# Patient Record
Sex: MALE | Race: WHITE | NOT HISPANIC OR LATINO | Employment: FULL TIME | URBAN - METROPOLITAN AREA
[De-identification: names, ages, dates, MRNs, and addresses within clinical notes are randomized per-mention and may not be internally consistent; named-entity substitution may affect disease eponyms.]

---

## 2017-02-21 ENCOUNTER — OFFICE VISIT (OUTPATIENT)
Dept: FAMILY MEDICINE | Facility: CLINIC | Age: 22
End: 2017-02-21
Payer: COMMERCIAL

## 2017-02-21 VITALS
SYSTOLIC BLOOD PRESSURE: 128 MMHG | HEIGHT: 73 IN | RESPIRATION RATE: 20 BRPM | BODY MASS INDEX: 23.37 KG/M2 | DIASTOLIC BLOOD PRESSURE: 72 MMHG | TEMPERATURE: 99 F | HEART RATE: 79 BPM | OXYGEN SATURATION: 99 % | WEIGHT: 176.38 LBS

## 2017-02-21 DIAGNOSIS — S31.831A ANAL TEAR: Primary | ICD-10-CM

## 2017-02-21 PROCEDURE — 99999 PR PBB SHADOW E&M-EST. PATIENT-LVL III: CPT | Mod: PBBFAC,,, | Performed by: FAMILY MEDICINE

## 2017-02-21 PROCEDURE — 99213 OFFICE O/P EST LOW 20 MIN: CPT | Mod: S$GLB,,, | Performed by: FAMILY MEDICINE

## 2017-02-21 PROCEDURE — 1160F RVW MEDS BY RX/DR IN RCRD: CPT | Mod: S$GLB,,, | Performed by: FAMILY MEDICINE

## 2017-02-21 NOTE — MR AVS SNAPSHOT
"    [facility]  [address] [place] [address]  Phone: [phone]                  Marycarmen Jj   2017 1:00 PM   Office Visit    Description:  Male : 1995   Provider:  Felicity Berry MD   Department:  [facility]           Reason for Visit     Rectal Bleeding                To Do List           Goals (5 Years of Data)     None      [facility]     [facility] On Call Nurse Care Line -  Assistance  Registered nurses in the [facility] On Call Center provide clinical advisement, health education, appointment booking, and other advisory services.  Call for this free service at [phone].             Medications           Message regarding Medications     Verify the changes and/or additions to your medication regime listed below are the same as discussed with your clinician today.  If any of these changes or additions are incorrect, please notify your healthcare provider.             Verify that the below list of medications is an accurate representation of the medications you are currently taking.  If none reported, the list may be blank. If incorrect, please contact your healthcare provider. Carry this list with you in case of emergency.                Clinical Reference Information           Your Vitals Were     BP Pulse Temp Resp Height Weight    128/72 (BP Location: Right arm, Patient Position: Sitting, BP Method: Manual) 79 98.7 °F (37.1 °C) (Oral) 20 6' 1" (1.854 m) 80 kg (176 lb 5.9 oz)    SpO2 BMI             99% 23.27 kg/m2         Blood Pressure          Most Recent Value    BP  128/72      Allergies as of 2017     No Known Allergies      Immunizations Administered on Date of Encounter - 2017     None      [facility] Sign-Up     Activating your [facility] account is as easy as 1-2-3!     1) Visit [email or web address], select Sign Up Now, enter this activation code and your date of birth, then select " Next.  [email or web address]  Expires: 4/7/2017  1:34 PM      2) Create a username and password to use when you visit [facility] in the future and select a security question in case you lose your password and select Next.    3) Enter your e-mail address and click Sign Up!    Additional Information  If you have questions, please e-mail [email or web address] or call [phone] to talk to our [facility] staff. Remember, [facility] is NOT to be used for urgent needs. For medical emergencies, dial 911.         Language Assistance Services     ATTENTION: Language assistance services are available, free of charge. Please call [phone].      ATENCIÓN: Si maikella reynaldo, tiene a gomes disposición servicios gratuitos de asistencia lingüística. Llame al [phone].     [facility] Ý: N?u b?n nói Ti?ng Vi?t, có các d?ch v? h? tr? ngôn ng? mi?n phí dành cho b?n. G?i s? [phone].         [facility] [employer] complies with applicable Federal civil rights laws and does not discriminate on the basis of race, color, national origin, age, disability, or sex.

## 2017-02-21 NOTE — PROGRESS NOTES
Routine Office Visit    Patient Name: Marycarmen Jj    : 1995  MRN: [id number]    Subjective:  Marycarmen is a 21 y.o. male who presents today for:    1.   21 y.o male, has sex with males,  - bleeding on the toilet paper after BMs - formed stools, going on for 1 week.  Discomfort when having the BM, no acute pains. Some itching around anus.  Middle of last week, put lotion on it - Kate because it felt dry.   Had this happen before, but usually went away a day after.   This time lasting for 1 week.  He sees the blood on/off, not with every BM.  He says that he may have had more aggressive intercourse last week and that may have caused tears but he does not know. He denies feeling any external hemorrhoids.      Past Medical History  History reviewed. No pertinent past medical history.    Past Surgical History  History reviewed. No pertinent past surgical history.    Family History  Family History   Problem Relation Age of Onset    No Known Problems Mother     Arthritis Father     Hypertension Maternal Uncle        Social History  Social History     Social History    Marital status: Single     Spouse name: N/A    Number of children: N/A    Years of education: N/A     Occupational History    Not on file.     Social History Main Topics    Smoking status: Never Smoker    Smokeless tobacco: Not on file    Alcohol use Not on file    Drug use: Not on file    Sexual activity: Not on file     Other Topics Concern    Not on file     Social History Narrative       Current Medications  No current outpatient prescriptions on file prior to visit.     No current facility-administered medications on file prior to visit.        Allergies   Review of patient's allergies indicates:  No Known Allergies    Review of Systems (Pertinent positives)  Constititutional: Weight loss, excess fatigue, chills, fever, night sweats, weakness, loss of appetite  Skin: Rash, itching, lesions, color changes  Eyes: Glasses,  "contacts, vision changes, double vision, blurred vision, pain discharge, excess tearing.  Ears: Earache, ringing in ears, discharge, hearing loss, hearing aid, popping, infection Nose: stuffy nose, mouth breathing, post-nasal drip,   Throat: hoarseness, bad breath, swelling, sore throat  Heart: Chest pain, angina, palpitations, extra heart beats, JONES, Murmur, claudication, PND  Stomach/Intestine: Heartburn, Nausea, vomiting, diarrhea, indigestion, bloating, constipation, change in bowel movements, blood in stool, jaundice, hemorrhoids, hernia, rectal bleeding or pain, colon polyps,    Visit Vitals    /72 (BP Location: Right arm, Patient Position: Sitting, BP Method: Manual)    Pulse 79    Temp 98.7 °F (37.1 °C) (Oral)    Resp 20    Ht 6' 1" (1.854 m)    Wt 80 kg (176 lb 5.9 oz)    SpO2 99%    BMI 23.27 kg/m2       GENERAL APPEARANCE: in no apparent distress and well developed and well nourished  RESPIRATORY: appears well, vitals normal, no respiratory distress, acyanotic, normal RR, chest clear, no wheezing, crepitations, rhonchi, normal symmetric air entry  HEART: regular rate and rhythm, S1, S2 normal, no murmur, click, rub or gallop.    NEUROLOGIC: normal without focal findings, CN II-XII are intact.    Extremities: warm/well perfused.  No abnormal hair patterns.  No clubbing, cyanosis or edema.    Rectal:   + 2 superficial external tears at outside of anus, nonbleeding.  One at 12 o'clock, one at 6' o'clock.  Rectal examination - no tenderness or masses.  No external hemorrhoids present.  PSYCH: Alert, oriented x 3, thought content appropriate, speech normal, pleasant and cooperative, good eye contact, well groomed, recall good, concentration/judgement good and apparently average intelligence.    Assessment/Plan:  Marycarmen Jj is a 21 y.o. male who presents today for :    Marycarmen was seen today for rectal bleeding.    Diagnoses and all orders for this visit:    Anal tear  -   PRN witch hazel " after stools for help with itching  -  Avoid male intercourse for 1 month until tissue is healed  -  If not healed or still having itching, return to clinic for inspection  -  Be careful with pain, worsening bleeding - if so, return to clinic    F/u PRN with worsening symptoms

## 2017-03-14 ENCOUNTER — OFFICE VISIT (OUTPATIENT)
Dept: FAMILY MEDICINE | Facility: CLINIC | Age: 22
End: 2017-03-14
Payer: COMMERCIAL

## 2017-03-14 VITALS
BODY MASS INDEX: 22.97 KG/M2 | HEART RATE: 102 BPM | SYSTOLIC BLOOD PRESSURE: 126 MMHG | DIASTOLIC BLOOD PRESSURE: 82 MMHG | RESPIRATION RATE: 18 BRPM | WEIGHT: 173.31 LBS | HEIGHT: 73 IN | TEMPERATURE: 99 F | OXYGEN SATURATION: 99 %

## 2017-03-14 DIAGNOSIS — J30.2 SEASONAL ALLERGIC RHINITIS, UNSPECIFIED ALLERGIC RHINITIS TRIGGER: Primary | ICD-10-CM

## 2017-03-14 PROCEDURE — 99999 PR PBB SHADOW E&M-EST. PATIENT-LVL III: CPT | Mod: PBBFAC,,, | Performed by: NURSE PRACTITIONER

## 2017-03-14 PROCEDURE — 99213 OFFICE O/P EST LOW 20 MIN: CPT | Mod: S$GLB,,, | Performed by: NURSE PRACTITIONER

## 2017-03-14 PROCEDURE — 1160F RVW MEDS BY RX/DR IN RCRD: CPT | Mod: S$GLB,,, | Performed by: NURSE PRACTITIONER

## 2017-03-14 RX ORDER — CETIRIZINE HYDROCHLORIDE 10 MG/1
10 TABLET ORAL DAILY
Qty: 90 TABLET | Refills: 1 | Status: SHIPPED | OUTPATIENT
Start: 2017-03-14 | End: 2017-08-08

## 2017-03-14 RX ORDER — AZELASTINE HYDROCHLORIDE, FLUTICASONE PROPIONATE 137; 50 UG/1; UG/1
1 SPRAY, METERED NASAL 2 TIMES DAILY
Qty: 1 BOTTLE | Refills: 5 | Status: SHIPPED | OUTPATIENT
Start: 2017-03-14 | End: 2017-06-08 | Stop reason: CLARIF

## 2017-03-14 NOTE — MR AVS SNAPSHOT
[facility]  [address] [place] [address]  Phone: [phone]                  Marycarmen Jj   3/14/2017 1:40 PM   Office Visit    Description:  Male : 1995   Provider:  Melissa Manzo NP   Department:  [facility]           Reason for Visit     Cough     Sinus Problem           Diagnoses this Visit        Comments    Seasonal allergic rhinitis, unspecified allergic rhinitis trigger    -  Primary            To Do List           Goals (5 Years of Data)     None      Follow-Up and Disposition     Return if symptoms worsen or fail to improve.       These Medications        Disp Refills Start End    cetirizine (ZYRTEC) 10 MG tablet 90 tablet 1 3/14/2017     Take 1 tablet (10 mg total) by mouth once daily. - Oral    Pharmacy: [id number] - [place], [place] - [address] Ph #: [phone]       azelastine-fluticasone 137-50 mcg/spray Spry nassal spray 1 Bottle 5 3/14/2017     1 spray by Each Nare route 2 (two) times daily. - Each Nare    Pharmacy: [id number] - [place], [place] - [address] Ph #: [phone]         [facility] Call     [facility] On Call Nurse Care Line -  Assistance  Registered nurses in the [facility] Center provide clinical advisement, health education, appointment booking, and other advisory services.  Call for this free service at [phone].             Medications           Message regarding Medications     Verify the changes and/or additions to your medication regime listed below are the same as discussed with your clinician today.  If any of these changes or additions are incorrect, please notify your healthcare provider.        START taking these NEW medications        Refills    cetirizine (ZYRTEC) 10 MG tablet 1    Sig: Take 1 tablet (10 mg total) by mouth once daily.    Class: Normal    Route:  "Oral    azelastine-fluticasone 137-50 mcg/spray Spry nassal spray 5    Si spray by Each Nare route 2 (two) times daily.    Class: Normal    Route: Each Nare           Verify that the below list of medications is an accurate representation of the medications you are currently taking.  If none reported, the list may be blank. If incorrect, please contact your healthcare provider. Carry this list with you in case of emergency.           Current Medications     azelastine-fluticasone 137-50 mcg/spray Spry nassal spray 1 spray by Each Nare route 2 (two) times daily.    cetirizine (ZYRTEC) 10 MG tablet Take 1 tablet (10 mg total) by mouth once daily.           Clinical Reference Information           Your Vitals Were     BP Pulse Temp Resp Height Weight    126/82 (BP Location: Left arm, Patient Position: Sitting, BP Method: Manual) 102 98.7 °F (37.1 °C) (Oral) 18 6' 1" (1.854 m) 78.6 kg (173 lb 4.5 oz)    SpO2 BMI             99% 22.86 kg/m2         Blood Pressure          Most Recent Value    BP  126/82      Allergies as of 3/14/2017     No Known Allergies      Immunizations Administered on Date of Encounter - 3/14/2017     None      [facility] Sign-Up     Activating your [facility] account is as easy as 1-2-3!     1) Visit [email or web address], select Sign Up Now, enter this activation code and your date of birth, then select Next.  [email or web address]  Expires: 2017  2:34 PM      2) Create a username and password to use when you visit [facility] in the future and select a security question in case you lose your password and select Next.    3) Enter your e-mail address and click Sign Up!    Additional Information  If you have questions, please e-mail [email or web address] or call [phone] to talk to our [facility] staff. Remember, [facility] is NOT to be used for urgent needs. For medical emergencies, dial 911.         Language Assistance Services     ATTENTION: Language assistance services are available, free of charge. " Please call [phone].      ATENCIÓN: Si habla español, tiene a gomes disposición servicios gratuitos de asistencia lingüística. Llame al [phone].     CHÚ Ý: N?u b?n nói Ti?ng Vi?t, có các d?ch v? h? tr? ngôn ng? mi?n phí dành cho b?n. G?i s? [phone].         [facility] complies with applicable Federal civil rights laws and does not discriminate on the basis of race, color, national origin, age, disability, or sex.

## 2017-03-14 NOTE — PROGRESS NOTES
Patient Name: Marycarmen Jj    : 1995  MRN: [id number]    Subjective:  Marycarmen is a 21 y.o. male who presents today for     1. Unproductive cough, scratchy throat, PND and nasal congestion x 2 weeks. Taking otc medication without improvement. Also reports concern anal irritation s/p anal tear, concern this may have  Recur.    Past Medical History  History reviewed. No pertinent past medical history.    Past Surgical History  History reviewed. No pertinent surgical history.    Family History  Family History   Problem Relation Age of Onset    No Known Problems Mother     Arthritis Father     Hypertension Maternal Uncle        Social History  Social History     Social History    Marital status: Single     Spouse name: N/A    Number of children: N/A    Years of education: N/A     Occupational History    Not on file.     Social History Main Topics    Smoking status: Never Smoker    Smokeless tobacco: Not on file    Alcohol use Not on file    Drug use: Not on file    Sexual activity: Not on file     Other Topics Concern    Not on file     Social History Narrative       Allergies  Review of patient's allergies indicates:  No Known Allergies -reviewed and updated      Medications  Reviewed and updated.   Current Outpatient Prescriptions   Medication Sig Dispense Refill    azelastine-fluticasone 137-50 mcg/spray Spry nassal spray 1 spray by Each Nare route 2 (two) times daily. 1 Bottle 5    cetirizine (ZYRTEC) 10 MG tablet Take 1 tablet (10 mg total) by mouth once daily. 90 tablet 1     No current facility-administered medications for this visit.          Review of Systems   Constitutional: Negative for chills and fever.   HENT: Positive for congestion. Negative for ear pain and sore throat.    Respiratory: Positive for cough and sputum production (yellow). Negative for shortness of breath and wheezing.    Cardiovascular: Negative for chest pain.   Musculoskeletal: Negative for myalgias.  "  Neurological: Negative for headaches.         Physical Exam  /82 (BP Location: Left arm, Patient Position: Sitting, BP Method: Manual)  Pulse 102  Temp 98.7 °F (37.1 °C) (Oral)   Resp 18  Ht 6' 1" (1.854 m)  Wt 78.6 kg (173 lb 4.5 oz)  SpO2 99%  BMI 22.86 kg/m2  Physical Exam   Constitutional: He appears well-developed and well-nourished. No distress.   HENT:   Head: Normocephalic.   Nose: Mucosal edema present. Right sinus exhibits no maxillary sinus tenderness. Left sinus exhibits no maxillary sinus tenderness.   Mouth/Throat: Oropharynx is clear and moist. No posterior oropharyngeal edema or posterior oropharyngeal erythema.   Cardiovascular: Normal rate, regular rhythm and normal heart sounds.    Pulmonary/Chest: Effort normal and breath sounds normal.   Genitourinary:   Genitourinary Comments: Anus appears normal, no obvious external tears noted   Skin: He is not diaphoretic.         Assessment/Plan:  Marycarmen Jj is a 21 y.o. male who presents today for :    Seasonal allergic rhinitis, unspecified allergic rhinitis trigger  -     cetirizine (ZYRTEC) 10 MG tablet; Take 1 tablet (10 mg total) by mouth once daily.  Dispense: 90 tablet; Refill: 1  -     azelastine-fluticasone 137-50 mcg/spray Spry nassal spray; 1 spray by Each Nare route 2 (two) times daily.  Dispense: 1 Bottle; Refill: 5        Return if symptoms worsen or fail to improve.      "

## 2017-05-02 ENCOUNTER — OFFICE VISIT (OUTPATIENT)
Dept: FAMILY MEDICINE | Facility: CLINIC | Age: 22
End: 2017-05-02
Payer: COMMERCIAL

## 2017-05-02 VITALS
DIASTOLIC BLOOD PRESSURE: 80 MMHG | WEIGHT: 171.94 LBS | SYSTOLIC BLOOD PRESSURE: 112 MMHG | HEIGHT: 73 IN | OXYGEN SATURATION: 99 % | TEMPERATURE: 99 F | RESPIRATION RATE: 20 BRPM | HEART RATE: 87 BPM | BODY MASS INDEX: 22.79 KG/M2

## 2017-05-02 DIAGNOSIS — A63.0 ANAL WARTS: Primary | ICD-10-CM

## 2017-05-02 PROCEDURE — 99999 PR PBB SHADOW E&M-EST. PATIENT-LVL III: CPT | Mod: PBBFAC,,, | Performed by: FAMILY MEDICINE

## 2017-05-02 PROCEDURE — 99213 OFFICE O/P EST LOW 20 MIN: CPT | Mod: S$GLB,,, | Performed by: FAMILY MEDICINE

## 2017-05-02 PROCEDURE — 1160F RVW MEDS BY RX/DR IN RCRD: CPT | Mod: S$GLB,,, | Performed by: FAMILY MEDICINE

## 2017-05-02 NOTE — PROGRESS NOTES
Routine Office Visit    Patient Name: Marycarmen Jj    : 1995  MRN: [id number]    Subjective:  Marycarmen is a 21 y.o. male who presents today for:    1.  Sore throat - occasional pain following fluids/solids.  Woke up with yellow mucous in his mouth this morning.  He tried zyrtec and a nasal spray but the nasal spray didn't help.  No sick contacts.   +more congestion.  Denies itchy watery eyes.  No recent traveling.   [place] this weekend.  About a month.  Way more bothersome at night.  Denies f/c/n/v/d.      2.  Bumps on the rectum - not painful.  Noticed them over the last week or two.  He is sexually active with his boyfriend for the past 2 years.      Past Medical History  History reviewed. No pertinent past medical history.    Past Surgical History  History reviewed. No pertinent surgical history.    Family History  Family History   Problem Relation Age of Onset    No Known Problems Mother     Arthritis Father     Hypertension Maternal Uncle        Social History  Social History     Social History    Marital status: Single     Spouse name: N/A    Number of children: N/A    Years of education: N/A     Occupational History    Not on file.     Social History Main Topics    Smoking status: Never Smoker    Smokeless tobacco: Not on file    Alcohol use Not on file    Drug use: Not on file    Sexual activity: Not on file     Other Topics Concern    Not on file     Social History Narrative       Current Medications  Current Outpatient Prescriptions on File Prior to Visit   Medication Sig Dispense Refill    cetirizine (ZYRTEC) 10 MG tablet Take 1 tablet (10 mg total) by mouth once daily. 90 tablet 1    azelastine-fluticasone 137-50 mcg/spray Spry nassal spray 1 spray by Each Nare route 2 (two) times daily. 1 Bottle 5     No current facility-administered medications on file prior to visit.        Allergies   Review of patient's allergies indicates:  No Known Allergies    Review of Systems  "(Pertinent positives)  Constititutional: Weight loss, excess fatigue, chills, fever, night sweats, weakness, loss of appetite  Ears: Earache, ringing in ears, discharge, hearing loss, hearing aid, popping, infection Nose: stuffy nose, mouth breathing, post-nasal drip,   Throat: hoarseness, bad breath, swelling, sore throat  Lungs: Cough, sputum, wheeze, frequent URI, SOA, Asthma  Heart: Chest pain, angina, palpitations, extra heart beats  Stomach/Intestine: Heartburn, Nausea, vomiting, diarrhea, indigestion, bloating, constipation  Bones/Muscles/Joints: Joint pain, deformities, back pain, swelling, stiffness  Brain: Numbness, tingling, tremor, fainting, headaches, muscle weakness    /80 (BP Location: Left arm, Patient Position: Sitting, BP Method: Manual)  Pulse 87  Temp 98.5 °F (36.9 °C) (Oral)   Resp 20  Ht 6' 1" (1.854 m)  Wt 78 kg (171 lb 15.3 oz)  SpO2 99%  BMI 22.69 kg/m2    GENERAL APPEARANCE: in no apparent distress and well developed and well nourished  RESPIRATORY: appears well, vitals normal, no respiratory distress, acyanotic, normal RR, chest clear, no wheezing, crepitations, rhonchi, normal symmetric air entry  HEART: regular rate and rhythm, S1, S2 normal, no murmur, click, rub or gallop.    NEUROLOGIC: normal without focal findings, CN II-XII are intact.   Anal: rectum with pinpoint white pustules - 3, at 12 o'clock.  One also noted at 6 o'clock.  Nontender.  No other masses identified. No evidence of lacerations.   PSYCH: Alert, oriented x 3, thought content appropriate, speech normal, pleasant and cooperative, good eye contact, well groomed, recall good, concentration/judgement good and apparently average intelligence.    Assessment/Plan:  Marycarmen Jj is a 21 y.o. male who presents today for :    Marycarmen was seen today for bumps on rectum and uri.    Diagnoses and all orders for this visit:    Anal warts   -   Discussed likely benign warts  -   Should do anal HPV testing at " annual exam    Sore throat  -  Likely allergy associated.   Low likelihood of strep throat as it has been chronic and going on for more than a month.  -  Continue Zyrtec, continue water intake.  Avoid allergens.     Over the counter remedies:   -Increase your water intake to 64-80 oz daily   -Nasal Saline spray (Over the counter [place] spray or [place]) 2 sprays each nostril - 2-3 times a day for nasal congestion   -Tylenol 500 mg 2 tablets or Ibuprofen 200 mg 2 tablets every 4-6 hours as needed for fever, headaches, sore throat, ear pain, bodyaches, and/or  nasal/sinus inflammation   -Warm salt water gargles with 1/2 cup water and 1 tablespoon salt every 4 hours   -Warm tea with honey and lemon   -Follow up with PCP in 1 week if symptoms do not resolve      F/u 1 month - annual

## 2017-05-02 NOTE — MR AVS SNAPSHOT
[facility]  [address] [place] [address]  Phone: [phone]                  Marycarmen Jj   2017 10:40 AM   Office Visit    Description:  Male : 1995   Provider:  Felicity Berry MD   Department:  [facility]           Reason for Visit     Bumps on rectum     URI                To Do List           Future Appointments        Provider Department Dept Phone    2017 8:00 AM Felicity Berry MD [facility] [phone]      Goals (5 Years of Data)     None      Follow-Up and Disposition     Return in about 4 weeks (around 2017) for annual.      [facility] On Call     [facility] On Call Nurse Care Line -  Assistance  Unless otherwise directed by your provider, please contact [facility], our nurse care line that is available for  assistance.     Registered nurses in the [facility] On Call Center provide: appointment scheduling, clinical advisement, health education, and other advisory services.  Call: [phone] (toll free)               Medications           Message regarding Medications     Verify the changes and/or additions to your medication regime listed below are the same as discussed with your clinician today.  If any of these changes or additions are incorrect, please notify your healthcare provider.             Verify that the below list of medications is an accurate representation of the medications you are currently taking.  If none reported, the list may be blank. If incorrect, please contact your healthcare provider. Carry this list with you in case of emergency.           Current Medications     cetirizine (ZYRTEC) 10 MG tablet Take 1 tablet (10 mg total) by mouth once daily.    azelastine-fluticasone 137-50 mcg/spray Spry nassal spray 1 spray by Each Nare route 2 (two) times daily.           Clinical Reference Information           Your Vitals Were     BP Pulse Temp Resp Height Weight     "112/80 (BP Location: Left arm, Patient Position: Sitting, BP Method: Manual) 87 98.5 °F (36.9 °C) (Oral) 20 6' 1" (1.854 m) 78 kg (171 lb 15.3 oz)    SpO2 BMI             99% 22.69 kg/m2         Blood Pressure          Most Recent Value    BP  112/80      Allergies as of 5/2/2017     No Known Allergies      Immunizations Administered on Date of Encounter - 5/2/2017     None      [facility] Sign-Up     Activating your [facility] account is as easy as 1-2-3!     1) Visit [email or web address], select Sign Up Now, enter this activation code and your date of birth, then select Next.  [email or web address]  Expires: 6/16/2017 11:23 AM      2) Create a username and password to use when you visit [facility] in the future and select a security question in case you lose your password and select Next.    3) Enter your e-mail address and click Sign Up!    Additional Information  If you have questions, please e-mail [email or web address] or call [phone] to talk to our [facility] staff. Remember, [facility] is NOT to be used for urgent needs. For medical emergencies, dial 911.         Language Assistance Services     ATTENTION: Language assistance services are available, free of charge. Please call [phone].      ATENCIÓN: Si habla español, tiene a gomes disposición servicios gratuitos de asistencia lingüística. Llame al [phone].     CHÚ Ý: N?u b?n nói Ti?ng Vi?t, có các d?ch v? h? tr? ngôn ng? mi?n phí dành cho b?n. G?i s? [phone].         [facility] complies with applicable Federal civil rights laws and does not discriminate on the basis of race, color, national origin, age, disability, or sex.        "

## 2017-05-09 ENCOUNTER — LAB VISIT (OUTPATIENT)
Dept: LAB | Facility: HOSPITAL | Age: 22
End: 2017-05-09
Attending: FAMILY MEDICINE
Payer: COMMERCIAL

## 2017-05-09 ENCOUNTER — OFFICE VISIT (OUTPATIENT)
Dept: FAMILY MEDICINE | Facility: CLINIC | Age: 22
End: 2017-05-09
Payer: COMMERCIAL

## 2017-05-09 VITALS
HEART RATE: 84 BPM | RESPIRATION RATE: 14 BRPM | WEIGHT: 173.5 LBS | SYSTOLIC BLOOD PRESSURE: 96 MMHG | HEIGHT: 73 IN | BODY MASS INDEX: 22.99 KG/M2 | OXYGEN SATURATION: 97 % | TEMPERATURE: 98 F | DIASTOLIC BLOOD PRESSURE: 74 MMHG

## 2017-05-09 DIAGNOSIS — Z11.51 SCREENING FOR HPV (HUMAN PAPILLOMAVIRUS): ICD-10-CM

## 2017-05-09 DIAGNOSIS — Z00.00 VISIT FOR WELL MAN HEALTH CHECK: ICD-10-CM

## 2017-05-09 DIAGNOSIS — Z11.3 SCREEN FOR STD (SEXUALLY TRANSMITTED DISEASE): ICD-10-CM

## 2017-05-09 DIAGNOSIS — Z00.00 VISIT FOR WELL MAN HEALTH CHECK: Primary | ICD-10-CM

## 2017-05-09 LAB
ALBUMIN SERPL BCP-MCNC: 4.3 G/DL
ALP SERPL-CCNC: 74 U/L
ALT SERPL W/O P-5'-P-CCNC: 16 U/L
ANION GAP SERPL CALC-SCNC: 6 MMOL/L
AST SERPL-CCNC: 16 U/L
BASOPHILS # BLD AUTO: 0.06 K/UL
BASOPHILS NFR BLD: 0.9 %
BILIRUB SERPL-MCNC: 1.1 MG/DL
BUN SERPL-MCNC: 11 MG/DL
CALCIUM SERPL-MCNC: 9.8 MG/DL
CHLORIDE SERPL-SCNC: 105 MMOL/L
CHOLEST/HDLC SERPL: 3.5 {RATIO}
CO2 SERPL-SCNC: 30 MMOL/L
CREAT SERPL-MCNC: 0.9 MG/DL
DIFFERENTIAL METHOD: ABNORMAL
EOSINOPHIL # BLD AUTO: 0.2 K/UL
EOSINOPHIL NFR BLD: 3.1 %
ERYTHROCYTE [DISTWIDTH] IN BLOOD BY AUTOMATED COUNT: 13 %
EST. GFR  (AFRICAN AMERICAN): >60 ML/MIN/1.73 M^2
EST. GFR  (NON AFRICAN AMERICAN): >60 ML/MIN/1.73 M^2
GLUCOSE SERPL-MCNC: 82 MG/DL
HCT VFR BLD AUTO: 45.5 %
HDL/CHOLESTEROL RATIO: 28.8 %
HDLC SERPL-MCNC: 125 MG/DL
HDLC SERPL-MCNC: 36 MG/DL
HGB BLD-MCNC: 15.6 G/DL
LDLC SERPL CALC-MCNC: 71.4 MG/DL
LYMPHOCYTES # BLD AUTO: 2.5 K/UL
LYMPHOCYTES NFR BLD: 36.5 %
MCH RBC QN AUTO: 29.2 PG
MCHC RBC AUTO-ENTMCNC: 34.3 %
MCV RBC AUTO: 85 FL
MONOCYTES # BLD AUTO: 0.7 K/UL
MONOCYTES NFR BLD: 9.6 %
NEUTROPHILS # BLD AUTO: 3.4 K/UL
NEUTROPHILS NFR BLD: 49.6 %
NONHDLC SERPL-MCNC: 89 MG/DL
PLATELET # BLD AUTO: 363 K/UL
PMV BLD AUTO: 10.5 FL
POTASSIUM SERPL-SCNC: 4.2 MMOL/L
PROT SERPL-MCNC: 8 G/DL
RBC # BLD AUTO: 5.35 M/UL
SODIUM SERPL-SCNC: 141 MMOL/L
TRIGL SERPL-MCNC: 88 MG/DL
WBC # BLD AUTO: 6.85 K/UL

## 2017-05-09 PROCEDURE — 87591 N.GONORRHOEAE DNA AMP PROB: CPT

## 2017-05-09 PROCEDURE — 36415 COLL VENOUS BLD VENIPUNCTURE: CPT | Mod: PN

## 2017-05-09 PROCEDURE — 99999 PR PBB SHADOW E&M-EST. PATIENT-LVL III: CPT | Mod: PBBFAC,,, | Performed by: FAMILY MEDICINE

## 2017-05-09 PROCEDURE — 80053 COMPREHEN METABOLIC PANEL: CPT

## 2017-05-09 PROCEDURE — 88112 CYTOPATH CELL ENHANCE TECH: CPT | Performed by: PATHOLOGY

## 2017-05-09 PROCEDURE — 80061 LIPID PANEL: CPT

## 2017-05-09 PROCEDURE — 86592 SYPHILIS TEST NON-TREP QUAL: CPT

## 2017-05-09 PROCEDURE — 88112 CYTOPATH CELL ENHANCE TECH: CPT | Mod: 26,,, | Performed by: PATHOLOGY

## 2017-05-09 PROCEDURE — 85025 COMPLETE CBC W/AUTO DIFF WBC: CPT

## 2017-05-09 PROCEDURE — 99395 PREV VISIT EST AGE 18-39: CPT | Mod: S$GLB,,, | Performed by: FAMILY MEDICINE

## 2017-05-09 PROCEDURE — 86803 HEPATITIS C AB TEST: CPT

## 2017-05-09 PROCEDURE — 86703 HIV-1/HIV-2 1 RESULT ANTBDY: CPT

## 2017-05-09 NOTE — MR AVS SNAPSHOT
[facility]  [address] [place] [address]  Phone: [phone]                  Marycarmen Jj   2017 8:00 AM   Office Visit    Description:  Male : 1995   Provider:  Felicity Berry MD   Department:  [facility]           Reason for Visit     Annual Exam           Diagnoses this Visit        Comments    Routine general medical examination at a health care facility    -  Primary     Screen for STD (sexually transmitted disease)         Visit for well [facility] health check         Screening for HPV (human papillomavirus)                To Do List           Goals (5 Years of Data)     None      [facility]     [facility] On Call Nurse Care Line -  Assistance  Unless otherwise directed by your provider, please contact [facility], our nurse care line that is available for  assistance.     Registered nurses in the [facility] On Call Center provide: appointment scheduling, clinical advisement, health education, and other advisory services.  Call: [phone] (toll free)               Medications           Message regarding Medications     Verify the changes and/or additions to your medication regime listed below are the same as discussed with your clinician today.  If any of these changes or additions are incorrect, please notify your healthcare provider.             Verify that the below list of medications is an accurate representation of the medications you are currently taking.  If none reported, the list may be blank. If incorrect, please contact your healthcare provider. Carry this list with you in case of emergency.           Current Medications     cetirizine (ZYRTEC) 10 MG tablet Take 1 tablet (10 mg total) by mouth once daily.    azelastine-fluticasone 137-50 mcg/spray Spry nassal spray 1 spray by Each Nare route 2 (two) times daily.           Clinical Reference Information           Your Vitals Were     BP Pulse Temp Resp Height  "Weight    96/74 (BP Location: Right arm, Patient Position: Sitting, BP Method: Manual) 84 98.1 °F (36.7 °C) (Oral) 14 6' 1" (1.854 m) 78.7 kg (173 lb 8 oz)    SpO2 BMI             97% 22.89 kg/m2         Blood Pressure          Most Recent Value    BP  96/74      Allergies as of 5/9/2017     No Known Allergies      Immunizations Administered on Date of Encounter - 5/9/2017     None      Orders Placed During Today's Visit      Normal Orders This Visit    Liquid-based pap smear, screening     Future Labs/Procedures Expected by Expires    C. trachomatis/N. gonorrhoeae by AMP DNA Urine  5/9/2017 7/8/2018    CBC auto differential  5/9/2017 5/9/2018    Comprehensive metabolic panel  5/9/2017 7/8/2018    Hepatitis C antibody  5/9/2017 7/8/2018    HIV-1 and HIV-2 antibodies  5/9/2017 7/8/2018    Lipid panel  5/9/2017 7/8/2018    RPR  5/9/2017 5/9/2018      [facility] Sign-Up     Activating your [facility] account is as easy as 1-2-3!     1) Visit [email or web address], select Sign Up Now, enter this activation code and your date of birth, then select Next.  [email or web address]  Expires: 6/16/2017 11:23 AM      2) Create a username and password to use when you visit [facility] in the future and select a security question in case you lose your password and select Next.    3) Enter your e-mail address and click Sign Up!    Additional Information  If you have questions, please e-mail [email or web address] or call [phone] to talk to our [facility] staff. Remember, [facility] is NOT to be used for urgent needs. For medical emergencies, dial 911.         Language Assistance Services     ATTENTION: Language assistance services are available, free of charge. Please call [phone].      ATENCIÓN: Si habla español, tiene a gomes disposición servicios gratuitos de asistencia lingüística. Llame al [phone].     CHÚ Ý: N?u b?n nói Ti?ng Vi?t, có các d?ch v? h? tr? ngôn ng? mi?n phí dành cho b?n. G?i s? [phone].         [facility] " [facility] complies with applicable Federal civil rights laws and does not discriminate on the basis of race, color, national origin, age, disability, or sex.

## 2017-05-09 NOTE — PROGRESS NOTES
"Well Man VISIT      CHIEF COMPLAINT  Chief Complaint   Patient presents with    Annual Exam       HPI  Marycarmen Jj is a 21 y.o. male who presents for physical.  He denies any complaints today. He had some allergy symptoms which are now improved.  He is in a monogamous relationship with his boyfriend for several years.  He has no history of STDs.   He's never been screened for HPV.     Social Factors  Tobacco use: No  Alcohol: Yes - minimal.  Per week - 2 drinks  Intimate partner violence screening  "Do you feel safe in your current relationship?" Yes  "Have you ever been in a relationship in which your partner frightened you or hurt you?" No  Regular Exercise: Yes - running.      Sleep - 6-7 hours per night.    Depression  Over the past two weeks, have you felt down, depressed, or hopeless? No  Over the past two weeks, have you felt little interest or pleasure in doing things? No    Reproductive Health  STD screening in last year:  Tested last year ago.  Routine Hep C, HIV, RPR, G/C  HIV screening: no    Screen for Chronic Disease  CHD Risk Factors: male gender  Estimated body mass index is 22.89 kg/(m^2) as calculated from the following:    Height as of this encounter: 6' 1" (1.854 m).    Weight as of this encounter: 78.7 kg (173 lb 8 oz).  Dyslipidemia screening needed: yes  T2DM screening needed: yes  Colonoscopy needed: no  PSA needed: yes  AAA screening needed:yes  Screen men 35 years and older, and men 20 to 34 years of age who have cardiovascular risk factors for dyslipidemia  Begin screening colonoscopies at 50 years of age in men of average risk, and continue until 75 years of age; offer fecal occult blood testing every year, flexible sigmoidoscopy every five years combined with fecal occult blood testing every three years, or colonoscopy every 10 years   The American Urological Association recommends offering PSA testing and digital rectal examination to well-informed men beginning at 40 " "years of age and continuing until life expectancy is less than 10 years  Screen once with ultrasonography in men 65 to 75 years of age if they have a family history or have smoked at [id number] cigarettes in their lifetime  Screen men with a sustained blood pressure greater than 135/80 mm Hg for T2DM    Dad - has rheumatoid arthritis.  Brother - RA.      Immunizations  up to date and documented    ALLERGIES and MEDS were verified.   PMHx, PSHx, FHx, SOCIALHx were updated as pertinent.    REVIEW OF SYSTEMS  Negative 5 point review of systems    PHYSICAL EXAM  VITAL SIGNS: BP 96/74 (BP Location: Right arm, Patient Position: Sitting, BP Method: Manual)  Pulse 84  Temp 98.1 °F (36.7 °C) (Oral)   Resp 14  Ht 6' 1" (1.854 m)  Wt 78.7 kg (173 lb 8 oz)  SpO2 97%  BMI 22.89 kg/m2  GEN: Well developed, Well nourished, No acute distress.  HENT: Normocephalic, Atraumatic, Bilateral external ears normal, Nose normal, Oropharynx moist, No oral exudates.   Eyes: PERRLA, EOMI, Conjunctiva normal, No discharge.   Neck: Supple, No tenderness.  Lymphatic: No cervical or supraclavicular lymphadenopathy noted.   Cardiovascular: Normal heart rate, Normal rhythm, No murmurs, No rubs, No gallops.   Thorax & Lungs: Normal breath sounds, No respiratory distress, No wheezing.  Abdomen: Soft, No tenderness, Bowel sounds normal.  Skin: Warm, Dry, No erythema, No rash.   Extremities: No edema, No tenderness.       ASSESSMENT/PLAN    Marycarmen was seen today for annual exam.    Diagnoses and all orders for this visit:    Visit for [facility] health check  -     CBC auto differential; Future  -     Comprehensive metabolic panel; Future  -     Lipid panel; Future    Screen for STD (sexually transmitted disease)  -     HIV-1 and HIV-2 antibodies; Future  -     RPR; Future  -     Hepatitis C antibody; Future  -     C. trachomatis/N. gonorrhoeae by AMP DNA Urine; Future    Screening for HPV (human papillomavirus)  -     Liquid-based pap smear, " screening    FOLLOW UP: PRN

## 2017-05-10 LAB
C TRACH DNA SPEC QL NAA+PROBE: NOT DETECTED
HCV AB SERPL QL IA: NEGATIVE
HIV 1+2 AB+HIV1 P24 AG SERPL QL IA: NEGATIVE
N GONORRHOEA DNA SPEC QL NAA+PROBE: NOT DETECTED
RPR SER QL: NORMAL

## 2017-05-11 ENCOUNTER — TELEPHONE (OUTPATIENT)
Dept: FAMILY MEDICINE | Facility: CLINIC | Age: 22
End: 2017-05-11

## 2017-05-11 NOTE — TELEPHONE ENCOUNTER
"Please call patient and notify that:    - STD testing was negative  - Your cholesterol panel was all within acceptable range.  The healthy "HDL" cholesterol could be a little better it's 36 and ideal is over 40, but that can improve with healthier eating habits.  Your CBC shows normal hemoglobin, which means you do not have any anemia.  Your platelet numbers were normal as well.     The HPV testing can take a few more days. We will let you know when that comes back.    Sincerely  Dr Berry    "

## 2017-05-15 ENCOUNTER — TELEPHONE (OUTPATIENT)
Dept: FAMILY MEDICINE | Facility: CLINIC | Age: 22
End: 2017-05-15

## 2017-05-15 DIAGNOSIS — R85.619 ABNORMAL ANAL PAPANICOLAOU SMEAR: Primary | ICD-10-CM

## 2017-05-15 NOTE — TELEPHONE ENCOUNTER
"Tried calling patient.  Told patient on VM that we would call him later today -     Results of (anal) PAP have come back for   - Low Grade Squamous intraepithelial lesion    (Can talk to patient directly)    This means that patient should be evaluated further to look for any irregularities in the tissue and potentially do biopsies of suspicious looking tissue. This requires patient to go to colorectal surgeon.  Low grade is shown a lot, and it is not an obvious "high grade" lesion which has higher risk of being concerning lesion.     I have put in the referral for the colorectal surgery.    Sincerely  Dr Berry    "

## 2017-05-16 ENCOUNTER — TELEPHONE (OUTPATIENT)
Dept: FAMILY MEDICINE | Facility: CLINIC | Age: 22
End: 2017-05-16

## 2017-05-30 ENCOUNTER — OFFICE VISIT (OUTPATIENT)
Dept: SURGERY | Facility: CLINIC | Age: 22
End: 2017-05-30
Payer: COMMERCIAL

## 2017-05-30 VITALS
WEIGHT: 169.75 LBS | SYSTOLIC BLOOD PRESSURE: 112 MMHG | HEART RATE: 76 BPM | DIASTOLIC BLOOD PRESSURE: 72 MMHG | BODY MASS INDEX: 22.5 KG/M2 | HEIGHT: 73 IN

## 2017-05-30 DIAGNOSIS — A63.0 ANAL CONDYLOMA: Primary | ICD-10-CM

## 2017-05-30 PROCEDURE — 46600 DIAGNOSTIC ANOSCOPY SPX: CPT | Mod: S$GLB,,, | Performed by: COLON & RECTAL SURGERY

## 2017-05-30 PROCEDURE — 99203 OFFICE O/P NEW LOW 30 MIN: CPT | Mod: 25,S$GLB,, | Performed by: COLON & RECTAL SURGERY

## 2017-05-30 PROCEDURE — 99999 PR PBB SHADOW E&M-EST. PATIENT-LVL III: CPT | Mod: PBBFAC,,, | Performed by: COLON & RECTAL SURGERY

## 2017-05-30 NOTE — LETTER
June 1, 2017      Felicity Berry MD  [address]  [place] [address]           [facility]  [address]  [facility] [address]  Phone: [phone]          Patient: Marycarmen Jj   MR Number: [id number]   YOB: 1995   Date of Visit: 5/30/2017       Dear Dr. Felicity Berry:    Thank you for referring Marycarmen Jj to me for evaluation. Attached you will find relevant portions of my assessment and plan of care.    If you have questions, please do not hesitate to call me. I look forward to following Marycarmen Jj along with you.    Sincerely,    Mark Bella MD    Enclosure  CC:  No Recipients    If you would like to receive this communication electronically, please contact [email or web address] or [phone] to request more information on [email or web address] Link access.    For providers and/or their staff who would like to refer a patient to [facility], please contact us through our one-stop-shop provider referral line, [facility], at [phone].    If you feel you have received this communication in error or would no longer like to receive these types of communications, please e-mail [email or web address]

## 2017-05-30 NOTE — PROGRESS NOTES
Subjective:       Patient ID: Marycarmen Jj is a 21 y.o. male.    Chief Complaint: Consult    HPI  22 yo M referred following abnormal anal PAP smear. Anal PAP done by PCP 5/10/17 - Low-grade squamous intraepithelial lesion (LSIL).  +MSM, +anal receptive intercourse, in a monagamous relationship.  HIV negative 2 weeks ago, no high risk behavior.  No significant pain with BM's or intercourse but does have occasional bleeding after BM's.    Last colonoscopy - never  No family hx of CRC or IBD.    Review of patient's allergies indicates:  No Known Allergies    History reviewed. No pertinent past medical history.    History reviewed. No pertinent surgical history.    Current Outpatient Prescriptions   Medication Sig Dispense Refill    cetirizine (ZYRTEC) 10 MG tablet Take 1 tablet (10 mg total) by mouth once daily. 90 tablet 1    azelastine-fluticasone 137-50 mcg/spray Spry nassal spray 1 spray by Each Nare route 2 (two) times daily. 1 Bottle 5     No current facility-administered medications for this visit.        Family History   Problem Relation Age of Onset    No Known Problems Mother     Arthritis Father     Hypertension Maternal Uncle        Social History     Social History    Marital status: Single     Spouse name: N/A    Number of children: N/A    Years of education: N/A     Social History Main Topics    Smoking status: Never Smoker    Smokeless tobacco: None    Alcohol use None    Drug use: Unknown    Sexual activity: Not Asked     Other Topics Concern    None     Social History Narrative    None       Review of Systems    Objective:      Physical Exam   Constitutional: He is oriented to person, place, and time. He appears well-developed and well-nourished.   HENT:   Head: Normocephalic and atraumatic.   Eyes: Conjunctivae are normal.   Pulmonary/Chest: Effort normal. No respiratory distress.   Abdominal: Soft. Bowel sounds are normal. He exhibits no distension and no mass. There is no  tenderness. There is no rebound and no guarding.   Genitourinary:   Genitourinary Comments: Perineum - normal perianal skin, no mass, no fissure, no external hemorrhoids, punctate external condyloma around anal verge  DEVEN - good tone, no mass  Anoscopy - No signifiant internal hemorrhoids, near-circumferential internal anal condyloma     Neurological: He is alert and oriented to person, place, and time.   Skin: Skin is warm and dry. No erythema.           Assessment:       1. Anal condyloma      LSIL on anal PAP    Plan:   Will schedule for OR for excision/fulgaration & biopsy of anal condyloma.    I have discussed the procedure at length with Marycarmen Jj.  We discussed the rationale, risks, benefits, and alternatives in depth.  We discussed the expected outcomes and potential complications including but not limited to anal stenosis, bleeding, infection, recurrence, prolonged pain, need for further procedures and altered continence.  He verbalized his understanding of the procedure and wishes to proceed.  Written consent was obtained.    Scheduled for 6/16/17.    Mark Bella MD, FACS, FASCRS

## 2017-06-08 ENCOUNTER — ANESTHESIA EVENT (OUTPATIENT)
Dept: SURGERY | Facility: HOSPITAL | Age: 22
End: 2017-06-08
Payer: COMMERCIAL

## 2017-06-08 NOTE — ANESTHESIA PREPROCEDURE EVALUATION
"Anesthesia Assessment: Preoperative EQUATION    Planned Procedure: Procedure(s) (LRB):  EXCISION-CONDYLOMA (N/A)  Requested Anesthesia Type:Choice  Surgeon: Mark Bella MD  Service: Colon and Rectal  Known or anticipated Date of Surgery:6/16/2017    Surgeon notes: reviewed and Anal condyloma    Electronic QUestionnaire Assessment completed via nurse interview with patient.        Marycarmen Jj [[id number]] - 21 y.o. Male     Providers Outside of [facility]     No data to display   Surgical Risk Level     Surgical Risk Level:  2       caRDScore (Clinical Anesthesia Rapid Decision Score)     Very Very Low   Total Score: 3    3 Sum of Clinical Scores   caRDScores (Grouped)     caRDScore - Ane:  1            caRDScore - CVD:  0            caRDScore - Pul:  0            caRDScore - Met:  0            caRDScore - Phy:  2       caRDScore Items     Flowsheet Row Pre-admit from 6/16/2017 in [facility]   Anesthesia   Has/has had bowel disease of small or large intestine Yes [Anal condyloma]   CVD   Activity similar to best ability for maximal activity or exercise METS 4   Pulmonary   Metabolic   Physiologic   Has disorder that impairs normal immunity No [Has "Halo effect"-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-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]   Flags     Red Flag Score:  0            Yellow Flag Score:  1       Red Flags     No data to display   Yellow Flags     Flowsheet Row Pre-admit from 6/16/2017 in [facility]   Has disorder that impairs normal immunity No- ["Halo effect"-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-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]   PONV Risk Score (assumes periop narcotic use = +1, Max=4)     PONV Risk Score:  2       PONV Risk Factors   Total Score: 1        Sleep Apnea   Total Score: 0    ABMAR STOP-Bang Risk Factors (Max=8)   Total Score: 1    1 Male   AMBAR Risk Level - 1 (Low), 2 (Moderate), 3 (High)     AMBAR Risk Level:  1       RCRI (Revised Cardiac Risk Indices of ACC/AHA guidelines, Max=6)   Total Score: 1    1 " Patient is having an intra-abdominal thoracic or major vascular case   CAD Risk Factors   Total Score: 1        CHADS Score if applicable (history of atrial fib/flutter, Max=6)   Total Score: 0    Maximal Exercise Capacity     Flowsheet Row Pre-admit from 6/16/2017 in [facility]   Maximal Exercise Capacity METS 4   Summary of Dependence   Total Score: 1    1 Is totally independent of others for activities of daily living   Phone Fraility Score (Max = 17)   Total Score: 0    Pain Factors     No data to display   Risk Triggers (Evidence-Based Risk Triggers)     Pulmonary Risk Triggers   Total Score: 1        Renal Risk Triggers   Total Score: 0    Delirium Risk Triggers   Total Score: 0    Urologic Risk Triggers   Total Score: 0    Logistics     Pre-op Clinic Logistics   Total Score: 1        DOSC Logistics   Total Score: 1        Discharge Logistics   Total Score: 0    Discharge Planning     Flowsheet Row Pre-admit from 6/16/2017 in [facility]   Discharge Planning   Will assist patient 24/7, if needed -- [[phone]]   Fast Track <For office use only>     Total Score: 2      Surgical Risk Level Assessment             Triage considerations:     The patient has no apparent active cardiac condition (No unstable coronary Syndrome such as severe unstable angina or recent [<1 month] myocardial infarction, decompensated CHF, severe valvular   disease or significant arrhythmia)    Previous anesthesia records:None and No pertinent surgical history.    Last PCP note: within 1 month , within [facility] , Annual Exam      Other important co-morbidities: None     Tests already available:  Results have been reviewed.Labs-5/19/17-Hep C antibody/RPR/HIV-1 & HIV 2 antibodies/Lipid Panel/CMP/CBC            Instructions given. (See in Nurse's note)    Optimization:  Phone    Plan:    Testing:  None needed at this time      Patient  has previously scheduled Medical  Appointment:None    Navigation:            Straight Line to surgery.               No tests, anesthesia preop clinic visit, or consult required.                 Therese Parry RN  6/8/17 06/08/2017    Pre-operative evaluation for Procedure(s) (LRB):  EXCISION-CONDYLOMA (N/A)    Marycarmen Jj is a 21 y.o. male with no significant PMH who is being evaluated for the procedure above secondary to internal anal condyloma and LSIL on anal PAP.     LDA:  None currently     Prev airway:  None on file     Drips:  None     There is no problem list on file for this patient.      Review of patient's allergies indicates:  No Known Allergies     No current facility-administered medications on file prior to encounter.      Current Outpatient Prescriptions on File Prior to Encounter   Medication Sig Dispense Refill    cetirizine (ZYRTEC) 10 MG tablet Take 1 tablet (10 mg total) by mouth once daily. 90 tablet 1       History reviewed. No pertinent surgical history.    Social History     Social History    Marital status: Single     Spouse name: N/A    Number of children: N/A    Years of education: N/A     Occupational History    Not on file.     Social History Main Topics    Smoking status: Never Smoker    Smokeless tobacco: Not on file    Alcohol use Not on file    Drug use: Unknown    Sexual activity: Not on file     Other Topics Concern    Not on file     Social History Narrative    No narrative on file         Vital Signs Range (Last 24H):         CBC: No results for input(s): WBC, RBC, HGB, HCT, PLT, MCV, MCH, MCHC in the last 72 hours.    CMP: No results for input(s): NA, K, CL, CO2, BUN, CREATININE, GLU, MG, PHOS, CALCIUM, ALBUMIN, PROT, ALKPHOS, ALT, AST, BILITOT in the last 72 hours.    INR  No results for input(s): INR, PROTIME, APTT in the last 72 hours.    Invalid input(s): PT        Anesthesia  Evaluation    I have reviewed the Patient Summary Reports.     I have reviewed the Medications.     Review of Systems  Anesthesia Hx:  Neg history of prior surgery. Denies Family Hx of Anesthesia complications.   Denies Personal Hx of Anesthesia complications.   Social:  Non-Smoker    Hematology/Oncology:     Oncology Normal     EENT/Dental:EENT/Dental Normal   Cardiovascular:  Cardiovascular Normal     Pulmonary:  Pulmonary Normal    Renal/:  Renal/ Normal     Hepatic/GI:  Hepatic/GI Normal    Neurological:  Neurology Normal    Endocrine:  Endocrine Normal        Physical Exam  General:  Well nourished    Airway/Jaw/Neck:  Airway Findings: Mouth Opening: Normal Tongue: Normal  General Airway Assessment: Adult  Mallampati: I  Jaw/Neck Findings:  Neck ROM: Normal ROM      Dental:  Dental Findings: In tact   Chest/Lungs:  Chest/Lungs Findings: Clear to auscultation     Heart/Vascular:  Heart Findings: Rate: Normal  Rhythm: Regular Rhythm  Sounds: Normal        Mental Status:  Mental Status Findings:  Cooperative         Anesthesia Plan  Type of Anesthesia, risks & benefits discussed:  Anesthesia Type:  general, MAC  Patient's Preference: general   Intra-op Monitoring Plan: standard ASA monitors  Intra-op Monitoring Plan Comments:   Post Op Pain Control Plan:   Post Op Pain Control Plan Comments:   Induction:   IV  Beta Blocker:  Patient is not currently on a Beta-Blocker (No further documentation required).       Informed Consent: Patient understands risks and agrees with Anesthesia plan.  Questions answered. Anesthesia consent signed with patient.  ASA Score: 2     Day of Surgery Review of History & Physical:    H&P update referred to the surgeon.         Ready For Surgery From Anesthesia Perspective.

## 2017-06-08 NOTE — PRE ADMISSION SCREENING
"Anesthesia Assessment: Preoperative EQUATION    Planned Procedure: Procedure(s) (LRB):  EXCISION-CONDYLOMA (N/A)  Requested Anesthesia Type:Choice  Surgeon: Mark Bella MD  Service: Colon and Rectal  Known or anticipated Date of Surgery:6/16/2017    Surgeon notes: reviewed and Anal condyloma    Electronic QUestionnaire Assessment completed via nurse interview with patient.        Marycarmen Jj [[id number]] - 21 y.o. Male     Providers Outside of [facility]     No data to display   Surgical Risk Level     Surgical Risk Level:  2       caRDScore (Clinical Anesthesia Rapid Decision Score)     Very Very Low   Total Score: 3    3 Sum of Clinical Scores   caRDScores (Grouped)     caRDScore - Ane:  1            caRDScore - CVD:  0            caRDScore - Pul:  0            caRDScore - Met:  0            caRDScore - Phy:  2       caRDScore Items     Flowsheet Row Pre-admit from 6/16/2017 in [facility]   Anesthesia   Has/has had bowel disease of small or large intestine Yes [Anal condyloma]   CVD   Activity similar to best ability for maximal activity or exercise METS 4   Pulmonary   Metabolic   Physiologic   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]   Flags     Red Flag Score:  0            Yellow Flag Score:  1       Red Flags     No data to display   Yellow Flags     Flowsheet Row Pre-admit from 6/16/2017 in [facility]   Has disorder that impairs normal immunity No- ["Halo effect"-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-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]   PONV Risk Score (assumes periop narcotic use = +1, Max=4)     PONV Risk Score:  2       PONV Risk Factors   Total Score: 1        Sleep Apnea   Total Score: 0    AMBAR STOP-Bang Risk Factors (Max=8)   Total Score: 1    1 Male   AMBAR Risk Level - 1 (Low), 2 (Moderate), 3 (High)     AMBAR Risk Level:  1       RCRI (Revised Cardiac Risk Indices of ACC/AHA guidelines, Max=6)   Total Score: 1    1 Patient is " having an intra-abdominal thoracic or major vascular case   CAD Risk Factors   Total Score: 1        CHADS Score if applicable (history of atrial fib/flutter, Max=6)   Total Score: 0    Maximal Exercise Capacity     Flowsheet Row Pre-admit from 6/16/2017 in [facility]   Maximal Exercise Capacity METS 4   Summary of Dependence   Total Score: 1    1 Is totally independent of others for activities of daily living   Phone Fraility Score (Max = 17)   Total Score: 0    Pain Factors     No data to display   Risk Triggers (Evidence-Based Risk Triggers)     Pulmonary Risk Triggers   Total Score: 1        Renal Risk Triggers   Total Score: 0    Delirium Risk Triggers   Total Score: 0    Urologic Risk Triggers   Total Score: 0    Logistics     Pre-op Clinic Logistics   Total Score: 1        DOSC Logistics   Total Score: 1        Discharge Logistics   Total Score: 0    Discharge Planning     Flowsheet Row Pre-admit from 6/16/2017 in [facility]   Discharge Planning   Will assist patient 24/7, if needed -- [[phone]]   Fast Track <For office use only>     Total Score: 2      Surgical Risk Level Assessment             Triage considerations:     The patient has no apparent active cardiac condition (No unstable coronary Syndrome such as severe unstable angina or recent [<1 month] myocardial infarction, decompensated CHF, severe valvular   disease or significant arrhythmia)    Previous anesthesia records:None and No pertinent surgical history.    Last PCP note: within 1 month , within [facility] , Annual Exam      Other important co-morbidities: None     Tests already available:  Results have been reviewed.Labs-5/19/17-Hep C antibody/RPR/HIV-1 & HIV 2 antibodies/Lipid Panel/CMP/CBC            Instructions given. (See in Nurse's note)    Optimization:  Phone    Plan:    Testing:  None needed at this time      Patient  has previously scheduled Medical Appointment:None    Navigation:             Straight Line to surgery.               No tests, anesthesia preop clinic visit, or consult required.                 Therese Parry RN  6/8/17

## 2017-06-15 ENCOUNTER — TELEPHONE (OUTPATIENT)
Dept: UROLOGY | Facility: CLINIC | Age: 22
End: 2017-06-15

## 2017-06-15 NOTE — TELEPHONE ENCOUNTER
----- Message from Gabbie Hackett sent at 6/15/2017 12:09 PM CDT -----  Contact: Pt:[phone]  Pt called and states he would like to speak with 's nurse in regards to having pelvic pain and pt states he has a lump.Pt states he want's to see  sooner then what's available. Which is in July.

## 2017-06-16 ENCOUNTER — HOSPITAL ENCOUNTER (OUTPATIENT)
Facility: HOSPITAL | Age: 22
Discharge: HOME | End: 2017-06-16
Attending: COLON & RECTAL SURGERY | Admitting: COLON & RECTAL SURGERY
Payer: COMMERCIAL

## 2017-06-16 ENCOUNTER — ANESTHESIA (OUTPATIENT)
Dept: SURGERY | Facility: HOSPITAL | Age: 22
End: 2017-06-16
Payer: COMMERCIAL

## 2017-06-16 VITALS
TEMPERATURE: 98 F | DIASTOLIC BLOOD PRESSURE: 78 MMHG | SYSTOLIC BLOOD PRESSURE: 133 MMHG | BODY MASS INDEX: 22.4 KG/M2 | RESPIRATION RATE: 12 BRPM | OXYGEN SATURATION: 100 % | HEART RATE: 75 BPM | WEIGHT: 169 LBS | HEIGHT: 73 IN

## 2017-06-16 DIAGNOSIS — A63.0 ANAL CONDYLOMA: ICD-10-CM

## 2017-06-16 PROCEDURE — 88305 TISSUE EXAM BY PATHOLOGIST: CPT | Performed by: PATHOLOGY

## 2017-06-16 PROCEDURE — 71000033 HC RECOVERY, INTIAL HOUR: Performed by: COLON & RECTAL SURGERY

## 2017-06-16 PROCEDURE — 25000003 PHARM REV CODE 250: Performed by: STUDENT IN AN ORGANIZED HEALTH CARE EDUCATION/TRAINING PROGRAM

## 2017-06-16 PROCEDURE — 25000003 PHARM REV CODE 250: Performed by: NURSE ANESTHETIST, CERTIFIED REGISTERED

## 2017-06-16 PROCEDURE — D9220A PRA ANESTHESIA: Mod: CRNA,,, | Performed by: NURSE ANESTHETIST, CERTIFIED REGISTERED

## 2017-06-16 PROCEDURE — 37000008 HC ANESTHESIA 1ST 15 MINUTES: Performed by: COLON & RECTAL SURGERY

## 2017-06-16 PROCEDURE — D9220A PRA ANESTHESIA: Mod: ANES,,, | Performed by: ANESTHESIOLOGY

## 2017-06-16 PROCEDURE — 36000706: Performed by: COLON & RECTAL SURGERY

## 2017-06-16 PROCEDURE — 88305 TISSUE EXAM BY PATHOLOGIST: CPT | Mod: 26,,, | Performed by: PATHOLOGY

## 2017-06-16 PROCEDURE — 36000707: Performed by: COLON & RECTAL SURGERY

## 2017-06-16 PROCEDURE — 71000039 HC RECOVERY, EACH ADD'L HOUR: Performed by: COLON & RECTAL SURGERY

## 2017-06-16 PROCEDURE — 25000003 PHARM REV CODE 250: Performed by: COLON & RECTAL SURGERY

## 2017-06-16 PROCEDURE — 25000003 PHARM REV CODE 250: Performed by: NURSE PRACTITIONER

## 2017-06-16 PROCEDURE — 37000009 HC ANESTHESIA EA ADD 15 MINS: Performed by: COLON & RECTAL SURGERY

## 2017-06-16 PROCEDURE — 63600175 PHARM REV CODE 636 W HCPCS: Performed by: NURSE ANESTHETIST, CERTIFIED REGISTERED

## 2017-06-16 RX ORDER — OXYCODONE HYDROCHLORIDE 5 MG/1
5 CAPSULE ORAL EVERY 4 HOURS PRN
Qty: 80 CAPSULE | Refills: 0 | Status: SHIPPED | OUTPATIENT
Start: 2017-06-16 | End: 2017-06-16 | Stop reason: HOSPADM

## 2017-06-16 RX ORDER — SUCCINYLCHOLINE CHLORIDE 20 MG/ML
INJECTION INTRAMUSCULAR; INTRAVENOUS
Status: DISCONTINUED | OUTPATIENT
Start: 2017-06-16 | End: 2017-06-16

## 2017-06-16 RX ORDER — OXYCODONE HYDROCHLORIDE 5 MG/1
TABLET ORAL
Status: DISCONTINUED
Start: 2017-06-16 | End: 2017-06-16 | Stop reason: HOSPADM

## 2017-06-16 RX ORDER — BUPIVACAINE HYDROCHLORIDE 2.5 MG/ML
INJECTION, SOLUTION EPIDURAL; INFILTRATION; INTRACAUDAL
Status: DISCONTINUED | OUTPATIENT
Start: 2017-06-16 | End: 2017-06-16 | Stop reason: HOSPADM

## 2017-06-16 RX ORDER — SODIUM CHLORIDE 9 MG/ML
INJECTION, SOLUTION INTRAVENOUS CONTINUOUS
Status: DISCONTINUED | OUTPATIENT
Start: 2017-06-16 | End: 2017-06-16 | Stop reason: HOSPADM

## 2017-06-16 RX ORDER — OXYCODONE HYDROCHLORIDE 5 MG/1
5 TABLET ORAL
Status: DISCONTINUED | OUTPATIENT
Start: 2017-06-16 | End: 2017-06-16

## 2017-06-16 RX ORDER — SODIUM CHLORIDE 0.9 % (FLUSH) 0.9 %
3 SYRINGE (ML) INJECTION
Status: DISCONTINUED | OUTPATIENT
Start: 2017-06-16 | End: 2017-06-16 | Stop reason: HOSPADM

## 2017-06-16 RX ORDER — OXYCODONE HYDROCHLORIDE 5 MG/1
10 TABLET ORAL
Status: DISCONTINUED | OUTPATIENT
Start: 2017-06-16 | End: 2017-06-16 | Stop reason: HOSPADM

## 2017-06-16 RX ORDER — LIDOCAINE HCL/PF 100 MG/5ML
SYRINGE (ML) INTRAVENOUS
Status: DISCONTINUED | OUTPATIENT
Start: 2017-06-16 | End: 2017-06-16

## 2017-06-16 RX ORDER — DOCUSATE SODIUM 100 MG/1
100 CAPSULE, LIQUID FILLED ORAL 2 TIMES DAILY
Qty: 100 CAPSULE | Refills: 0 | Status: SHIPPED | OUTPATIENT
Start: 2017-06-16 | End: 2017-08-08

## 2017-06-16 RX ORDER — ONDANSETRON 2 MG/ML
4 INJECTION INTRAMUSCULAR; INTRAVENOUS DAILY PRN
Status: DISCONTINUED | OUTPATIENT
Start: 2017-06-16 | End: 2017-06-16 | Stop reason: HOSPADM

## 2017-06-16 RX ORDER — PROPOFOL 10 MG/ML
VIAL (ML) INTRAVENOUS
Status: DISCONTINUED | OUTPATIENT
Start: 2017-06-16 | End: 2017-06-16

## 2017-06-16 RX ORDER — ROCURONIUM BROMIDE 10 MG/ML
INJECTION, SOLUTION INTRAVENOUS
Status: DISCONTINUED | OUTPATIENT
Start: 2017-06-16 | End: 2017-06-16

## 2017-06-16 RX ORDER — FENTANYL CITRATE 50 UG/ML
INJECTION, SOLUTION INTRAMUSCULAR; INTRAVENOUS
Status: DISCONTINUED | OUTPATIENT
Start: 2017-06-16 | End: 2017-06-16

## 2017-06-16 RX ORDER — OXYCODONE HYDROCHLORIDE 5 MG/1
5 TABLET ORAL EVERY 4 HOURS PRN
Qty: 80 TABLET | Refills: 0 | Status: SHIPPED | OUTPATIENT
Start: 2017-06-16 | End: 2017-08-08

## 2017-06-16 RX ORDER — MIDAZOLAM HYDROCHLORIDE 1 MG/ML
INJECTION, SOLUTION INTRAMUSCULAR; INTRAVENOUS
Status: DISCONTINUED | OUTPATIENT
Start: 2017-06-16 | End: 2017-06-16

## 2017-06-16 RX ORDER — MEPERIDINE HYDROCHLORIDE 50 MG/ML
12.5 INJECTION INTRAMUSCULAR; INTRAVENOUS; SUBCUTANEOUS ONCE AS NEEDED
Status: DISCONTINUED | OUTPATIENT
Start: 2017-06-16 | End: 2017-06-16 | Stop reason: HOSPADM

## 2017-06-16 RX ORDER — ONDANSETRON 2 MG/ML
INJECTION INTRAMUSCULAR; INTRAVENOUS
Status: DISCONTINUED | OUTPATIENT
Start: 2017-06-16 | End: 2017-06-16

## 2017-06-16 RX ORDER — POLYETHYLENE GLYCOL 3350 17 G/17G
17 POWDER, FOR SOLUTION ORAL DAILY
Qty: 850 G | Refills: 0 | Status: SHIPPED | OUTPATIENT
Start: 2017-06-16 | End: 2017-08-08

## 2017-06-16 RX ADMIN — SODIUM CHLORIDE: 0.9 INJECTION, SOLUTION INTRAVENOUS at 01:06

## 2017-06-16 RX ADMIN — FENTANYL CITRATE 50 MCG: 50 INJECTION, SOLUTION INTRAMUSCULAR; INTRAVENOUS at 03:06

## 2017-06-16 RX ADMIN — SODIUM CHLORIDE, SODIUM GLUCONATE, SODIUM ACETATE, POTASSIUM CHLORIDE, MAGNESIUM CHLORIDE, SODIUM PHOSPHATE, DIBASIC, AND POTASSIUM PHOSPHATE: .53; .5; .37; .037; .03; .012; .00082 INJECTION, SOLUTION INTRAVENOUS at 03:06

## 2017-06-16 RX ADMIN — MIDAZOLAM HYDROCHLORIDE 2 MG: 1 INJECTION, SOLUTION INTRAMUSCULAR; INTRAVENOUS at 03:06

## 2017-06-16 RX ADMIN — LIDOCAINE HYDROCHLORIDE 100 MG: 20 INJECTION, SOLUTION INTRAVENOUS at 03:06

## 2017-06-16 RX ADMIN — ROCURONIUM BROMIDE 5 MG: 10 INJECTION, SOLUTION INTRAVENOUS at 03:06

## 2017-06-16 RX ADMIN — SUCCINYLCHOLINE CHLORIDE 140 MG: 20 INJECTION, SOLUTION INTRAMUSCULAR; INTRAVENOUS at 03:06

## 2017-06-16 RX ADMIN — PROPOFOL 200 MG: 10 INJECTION, EMULSION INTRAVENOUS at 03:06

## 2017-06-16 RX ADMIN — ONDANSETRON 4 MG: 2 INJECTION INTRAMUSCULAR; INTRAVENOUS at 03:06

## 2017-06-16 RX ADMIN — OXYCODONE HYDROCHLORIDE 10 MG: 5 TABLET ORAL at 04:06

## 2017-06-16 NOTE — TRANSFER OF CARE
"Anesthesia Transfer of Care Note    Patient: Marycarmen Jj    Procedure(s) Performed: Procedure(s) (LRB):  EXCISION-CONDYLOMA (N/A)    Patient location: PACU    Anesthesia Type: general    Transport from OR: Transported from OR on room air with adequate spontaneous ventilation. Transported from OR on 6-10 L/min O2 by face mask with adequate spontaneous ventilation    Post pain: adequate analgesia    Post assessment: no apparent anesthetic complications and tolerated procedure well    Post vital signs: stable    Level of consciousness: awake    Nausea/Vomiting: no nausea/vomiting    Complications: none    Transfer of care protocol was followed      Last vitals:   Visit Vitals  /66 (BP Location: Left arm, Patient Position: Lying, BP Method: Automatic)   Pulse 68   Temp 36.4 °C (97.5 °F) (Axillary)   Resp 18   Ht 6' 1" (1.854 m)   Wt 76.7 kg (169 lb)   SpO2 100%   BMI 22.30 kg/m²     "

## 2017-06-16 NOTE — ANESTHESIA RELEASE NOTE
Anesthesia Release from PACU note    Patient: Marycarmen Jj    Procedure(s) Performed: Procedure(s) (LRB):  EXCISION-CONDYLOMA (N/A)    Anesthesia type: general    Post pain: Adequate analgesia    Post assessment: no apparent anesthetic complications, tolerated procedure well and no evidence of recall    Last Vitals:   Vitals:    06/16/17 1630   BP: (!) 151/92   Pulse: 89   Resp: 20   Temp:    SpO2: 100%       Post vital signs: stable    Level of consciousness: awake, alert  and oriented    Nausea/Vomiting: no nausea/no vomiting    Complications: none    Airway Patency: patent    Respiratory: unassisted    Cardiovascular: stable and blood pressure at baseline    Hydration: euvolemic

## 2017-06-16 NOTE — DISCHARGE INSTRUCTIONS
Recommend conservative measures:  - Increased fiber intake (20-25 grams/day) and fluid intake (8-10 glasses water/day)  - Take colace and miralax daily, can add metamucil or mineral oil as stool softener.   - Daily fiber supplement  - Soaks/sitz baths twice a day and after every bowel movement  - Avoid excessive trauma/straining if possible  - Avoid sitting on toilet for long periods  - You have packing in your rectum which will fall with first bowel movement.

## 2017-06-16 NOTE — INTERVAL H&P NOTE
The patient has been examined and the H&P has been reviewed:    I concur with the findings and no changes have occurred since H&P was written.    Anesthesia/Surgery risks, benefits and alternative options discussed and understood by patient/family.          Active Hospital Problems    Diagnosis  POA    Anal condyloma [A63.0]  Yes      Resolved Hospital Problems    Diagnosis Date Resolved POA   No resolved problems to display.

## 2017-06-16 NOTE — PLAN OF CARE
Discharge instructions given and explained to patient and family with verbalization of understanding all instructions in PACU. Prescription given and explained next time and doses of each medication. Patients v/s stable, denies n/v and tolerating po, rates pain level tolerable, IV removed, and family at bedside for patient discharge home.

## 2017-06-16 NOTE — ANESTHESIA POSTPROCEDURE EVALUATION
"Anesthesia Post Evaluation    Patient: Marycarmen Jj    Procedure(s) Performed: Procedure(s) (LRB):  EXCISION-CONDYLOMA (N/A)    Final Anesthesia Type: general  Patient location during evaluation: PACU  Patient participation: Yes- Able to Participate  Level of consciousness: awake and alert  Post-procedure vital signs: reviewed and stable  Pain management: adequate  Airway patency: patent  PONV status at discharge: No PONV  Anesthetic complications: no      Cardiovascular status: blood pressure returned to baseline  Respiratory status: unassisted  Hydration status: euvolemic  Follow-up not needed.        Visit Vitals  BP (!) 151/92 (BP Location: Left arm, Patient Position: Lying, BP Method: Automatic)   Pulse 89   Temp 36.4 °C (97.5 °F) (Axillary)   Resp 20   Ht 6' 1" (1.854 m)   Wt 76.7 kg (169 lb)   SpO2 100%   BMI 22.30 kg/m²       Pain/Prachi Score: Pain Assessment Performed: Yes (6/16/2017  4:15 PM)  Presence of Pain: denies (6/16/2017  4:15 PM)  Pain Rating Prior to Med Admin: 7 (6/16/2017  4:24 PM)  Prachi Score: 10 (6/16/2017  4:15 PM)      "

## 2017-06-16 NOTE — BRIEF OP NOTE
[facility]  Brief Operative Note     SUMMARY     Surgery Date: 6/16/2017     Surgeon(s) and Role:     * Mark Bella MD - Primary     * Zunilda Urbina MD - Resident - Assisting        Pre-op Diagnosis:  Anal condyloma [A63.0]    Post-op Diagnosis:  Post-Op Diagnosis Codes:     * Anal condyloma [A63.0]    Procedure(s) (LRB):  EXCISION-CONDYLOMA (N/A)    Anesthesia: Choice    Description of the findings of the procedure: Excision of external and internal condylomas, biopsy sent      Findings/Key Components: See Op note    Estimated Blood Loss: * No values recorded between 6/16/2017  3:34 PM and 6/16/2017  3:58 PM *         Specimens:   Specimen (12h ago through future)    Start     Ordered    06/16/17 1542  Specimen to Pathology - Surgery  Once     Comments:  1. External condyloma (permanent)2. Internal condyloma (permanent)      06/16/17 1546          Discharge Note    SUMMARY     Admit Date: 6/16/2017    Discharge Date and Time:  06/16/2017 4:05 PM    Hospital Course (synopsis of major diagnoses, care, treatment, and services provided during the course of the hospital stay):   Patient underwent procedure and tolerated well      Final Diagnosis: Post-Op Diagnosis Codes:     * Anal condyloma [A63.0]    Disposition: Home or Self Care    Follow Up/Patient Instructions:     Medications:  Reconciled Home Medications:   Current Discharge Medication List      START taking these medications    Details   docusate sodium (COLACE) 100 MG capsule Take 1 capsule (100 mg total) by mouth 2 (two) times daily.  Qty: 100 capsule, Refills: 0      oxycodone (OXY-IR) 5 mg Cap Take 1 capsule (5 mg total) by mouth every 4 (four) hours as needed for Pain.  Qty: 80 capsule, Refills: 0      polyethylene glycol (MIRALAX) 17 gram/dose powder Take 17 g by mouth once daily.  Qty: 850 g, Refills: 0         CONTINUE these medications which have NOT CHANGED    Details   cetirizine (ZYRTEC) 10 MG tablet Take 1 tablet  (10 mg total) by mouth once daily.  Qty: 90 tablet, Refills: 1    Associated Diagnoses: Seasonal allergic rhinitis, unspecified allergic rhinitis trigger             Discharge Procedure Orders  Diet general     Activity as tolerated     Call MD for:  temperature >100.4     Call MD for:  persistent nausea and vomiting     Call MD for:  severe uncontrolled pain     Call MD for:  difficulty breathing, headache or visual disturbances     Call MD for:  redness, tenderness, or signs of infection (pain, swelling, redness, odor or green/yellow discharge around incision site)     Call MD for:  hives     Call MD for:  persistent dizziness or light-headedness     Call MD for:  extreme fatigue       Follow-up Information     Mark Bella MD. Schedule an appointment as soon as possible for a visit in 3 weeks.    Specialty:  Colon and Rectal Surgery  Why:  For wound re-check  Contact information:  [address]  [facility] [id number] [phone]

## 2017-06-17 NOTE — OP NOTE
DATE OF PROCEDURE:  06/16/2017    PREOPERATIVE DIAGNOSIS:  Anal condyloma.    POSTOPERATIVE DIAGNOSIS:  Anal condyloma.    PROCEDURE:  Excision and fulguration of anal condyloma.    SURGEON:  Mark Bella M.D.    ASSISTANT:  Zunilda Hernandez M.D.(RES)    ANESTHESIA:  General endotracheal.    IV FLUIDS:  1 L of crystalloid.    BLOOD LOSS:  Less than 5 mL    DRAINS:  None.    SPECIMENS:  1. External anal condyloma.  2. Internal anal condyloma.    COMPLICATIONS:  None.    OPERATIVE FINDINGS:  Punctate small external condyloma scattered around the anal   margin and anal verge.  Near circumferential internal condyloma at the level of   the anal transition zone.    COMPLICATIONS:  None.    INDICATIONS:  The patient is a 21-year-old male who presented to the office with   complaints of anal condyloma.  He presents today for elective surgical   management.    DESCRIPTION OF PROCEDURE:  The patient was identified, brought to the Operating   Room and placed on the stretcher in a supine position after obtaining informed   consent.  Venous sequential stockings were placed.  After induction of general   endotracheal anesthesia, he was repositioned on the operating table in a prone   position using chest rolls and adequate padding of all pressure points.  The   table was jackknifed and the buttocks taped apart to efface the anal verge.    Perianal region was prepped and draped in usual sterile fashion.  On initial   external inspection, there were multiple very small punctate condylomas   scattered around the anal margin and anal verge.  These were all excised sharply   utilizing tenotomy scissors and sent to Pathology as specimen.  Hemostasis was   achieved with electrocautery.  Once we had eradicated all the external disease,   we evaluated the internal anal canal using lighted Seirra retractors.  There was   near confluent disease present with fairly extensive internal anal condyloma   circumferentially at the level of the  dentate line.  The largest of these were   excised sharply using tenotomy scissors and sent to Pathology as specimen.    Hemostasis was achieved with electrocautery.  Remaining condyloma were   fulgurated with electrocautery down to healthy submucosa.  Once we had   eradicated all internal disease as well the anal canal was irrigated.    Hemostasis was noted to be adequate.  Gelfoam gauze was placed within the anal   canal.  Perianal block was performed using a combination of Exparel and 0.25%   Marcaine for postoperative analgesia and sterile dressing was applied.    The patient tolerated the procedure well with no complications.  He was   extubated in the Operating Room and taken to Recovery in satisfactory condition.    All needle, instrument and sponge counts were correct at the end of the case.    I was present throughout the entire procedure.      THUY  dd: 06/17/2017 07:26:37 (CDT)  td: 06/17/2017 08:04:58 (CDT)  Doc ID   #[id number]  Job ID #[id number]    CC:

## 2017-06-20 ENCOUNTER — NURSE TRIAGE (OUTPATIENT)
Dept: ADMINISTRATIVE | Facility: CLINIC | Age: 22
End: 2017-06-20

## 2017-06-20 NOTE — TELEPHONE ENCOUNTER
Reason for Disposition   Caller has NON-URGENT question and triager unable to answer question    Answer Assessment - Initial Assessment Questions  Pt had surgery 6/1/6/17 for internal and external anal warts he reported. He wants to know if he should still be having some bleeding. Reported he has had bleeding since surgery but a little more today- will feel like he needs to have a bm but will pass gas and then note blood on the wipe. Denied blood in toilet or passing clots. Did have a more solid stool today than he has had since surgery which he thinks is because he couldn't take his meds at work yesterday. Also was told ok to use medical wipes- he has some prep H wipes but they say not to use with bleeding. He thinks the bleeding is more from the inside than outside.    Protocols used: ST POST-OP SYMPTOMS AND [id number]    Advised if stool passed is more solid than what he has previously been doing this can possibly account for the slight increase in bleeding today. Recommended he check with 's office tomorrow regarding use of wipes and any other advice about the rectal bleeding. Advised to call back if bleeding increases or new sx's this pm.

## 2017-06-21 ENCOUNTER — TELEPHONE (OUTPATIENT)
Dept: SURGERY | Facility: CLINIC | Age: 22
End: 2017-06-21

## 2017-06-21 ENCOUNTER — TELEPHONE (OUTPATIENT)
Dept: FAMILY MEDICINE | Facility: CLINIC | Age: 22
End: 2017-06-21

## 2017-06-21 NOTE — TELEPHONE ENCOUNTER
----- Message from Agnes Reich sent at 6/21/2017 11:30 AM CDT -----  Contact: Pt# [phone]  Pt states he had surgery 6/16 and have some questions

## 2017-06-21 NOTE — TELEPHONE ENCOUNTER
----- Message from Akua Hall sent at 6/20/2017  5:34 PM CDT -----  Contact: pt  Pt called and states he have some questions to ask and would not give the questions when ask. Pt can be reached at [phone].

## 2017-06-21 NOTE — TELEPHONE ENCOUNTER
----- Message from Agnes Reich sent at 6/21/2017 11:36 AM CDT -----  Contact: Pt# [phone]  Pt states he was returning the nurse phone call back

## 2017-06-21 NOTE — TELEPHONE ENCOUNTER
Pt said he passed some blood clots and was concerned. Told him that can be expected following surgery. He did have brb when he passed gas yesterday. Today that has not happened. Told him to call back if he does pass large amount of brb. Pt voiced understanding.

## 2017-06-22 NOTE — TELEPHONE ENCOUNTER
Left voicemail advising that I saw where he had been in contact with colon/rect nurse and to return call if needed.

## 2017-06-27 ENCOUNTER — OFFICE VISIT (OUTPATIENT)
Dept: SURGERY | Facility: CLINIC | Age: 22
End: 2017-06-27
Payer: COMMERCIAL

## 2017-06-27 VITALS
HEART RATE: 81 BPM | WEIGHT: 173.5 LBS | SYSTOLIC BLOOD PRESSURE: 108 MMHG | BODY MASS INDEX: 22.89 KG/M2 | DIASTOLIC BLOOD PRESSURE: 68 MMHG

## 2017-06-27 DIAGNOSIS — A63.0 ANAL CONDYLOMA: Primary | ICD-10-CM

## 2017-06-27 DIAGNOSIS — K62.82 MILD ANAL DYSPLASIA: ICD-10-CM

## 2017-06-27 PROCEDURE — 99999 PR PBB SHADOW E&M-EST. PATIENT-LVL III: CPT | Mod: PBBFAC,,, | Performed by: COLON & RECTAL SURGERY

## 2017-06-27 PROCEDURE — 99024 POSTOP FOLLOW-UP VISIT: CPT | Mod: S$GLB,,, | Performed by: COLON & RECTAL SURGERY

## 2017-06-27 NOTE — LETTER
July 6, 2017        Fleicity Berry MD  [address] [facility]  [place] [id number]             [facility]  [address]  [facility] [address]  Phone: [phone]   Patient: Marycarmen Jj   MR Number: [id number]   YOB: 1995   Date of Visit: 6/27/2017       Dear Dr. Berry:    Thank you for referring Marycarmen Jj to me for evaluation. Attached you will find relevant portions of my assessment and plan of care.    If you have questions, please do not hesitate to call me. I look forward to following Marycarmen Jj along with you.    Sincerely,      Mark Bella MD            CC  No Recipients    Enclosure

## 2017-07-06 NOTE — PROGRESS NOTES
CRS Post-operative visit    Visit Info:     Procedure: Excision/fulgaration of anal condyloma    Date of Procedure: June 16, 2017    Indication: 20 yo M referred following abnormal anal PAP smear. Anal PAP done by PCP 5/10/17 - Low-grade squamous intraepithelial lesion (LSIL).  +MSM, +anal receptive intercourse, in a monagamous relationship.  HIV negative 2 weeks ago, no high risk behavior.  No significant pain with BM's or intercourse but does have occasional bleeding after BM's.  Taken to OR 6/16/17 for fulgaration/excision of anal condyloma.  Found to have near-circumferential internal lesions at level of anal transition zone.     Current Status: Doing well postoperatively.  Has expected degree of post-op discomfort, minimal bleeding with BM's.  No problems with continence.  No F/C.    Pathology:   1. External condyloma:  Condyloma acuminata with low-grade dysplasia.    2. Internal condyloma:  Condyloma acuminata with low-grade dysplasia.    Physical Exam:  General: White male in NAD   Neuro: aaox4   Respiratory: resps even unlabored  Extremities: Warm dry and intact  Anorectal: Anal margin sites healing well, DEVEN/anoscopy deferred due to proximity to surgery    Assessment:  Anal condyloma with low grade dysplasia, s/p excision/fulgaration    Plan:  RTO for surveillance anoscopy in 3 months.    Mark Bella MD, FACS, FASCRS

## 2017-08-08 ENCOUNTER — OFFICE VISIT (OUTPATIENT)
Dept: FAMILY MEDICINE | Facility: CLINIC | Age: 22
End: 2017-08-08
Payer: COMMERCIAL

## 2017-08-08 VITALS
RESPIRATION RATE: 20 BRPM | WEIGHT: 173 LBS | SYSTOLIC BLOOD PRESSURE: 124 MMHG | BODY MASS INDEX: 22.93 KG/M2 | HEIGHT: 73 IN | OXYGEN SATURATION: 97 % | HEART RATE: 87 BPM | TEMPERATURE: 99 F | DIASTOLIC BLOOD PRESSURE: 72 MMHG

## 2017-08-08 DIAGNOSIS — Z20.2 EXPOSURE TO GONORRHEA: Primary | ICD-10-CM

## 2017-08-08 PROCEDURE — 99999 PR PBB SHADOW E&M-EST. PATIENT-LVL III: CPT | Mod: PBBFAC,,, | Performed by: FAMILY MEDICINE

## 2017-08-08 PROCEDURE — 99214 OFFICE O/P EST MOD 30 MIN: CPT | Mod: 25,S$GLB,, | Performed by: FAMILY MEDICINE

## 2017-08-08 PROCEDURE — 96372 THER/PROPH/DIAG INJ SC/IM: CPT | Mod: S$GLB,,, | Performed by: FAMILY MEDICINE

## 2017-08-08 PROCEDURE — 3008F BODY MASS INDEX DOCD: CPT | Mod: S$GLB,,, | Performed by: FAMILY MEDICINE

## 2017-08-08 RX ORDER — AZITHROMYCIN 250 MG/1
TABLET, FILM COATED ORAL
Qty: 4 TABLET | Refills: 0 | Status: SHIPPED | OUTPATIENT
Start: 2017-08-08 | End: 2017-08-13

## 2017-08-08 RX ORDER — CEFTRIAXONE 250 MG/1
250 INJECTION, POWDER, FOR SOLUTION INTRAMUSCULAR; INTRAVENOUS
Status: COMPLETED | OUTPATIENT
Start: 2017-08-08 | End: 2017-08-08

## 2017-08-08 RX ADMIN — CEFTRIAXONE 250 MG: 250 INJECTION, POWDER, FOR SOLUTION INTRAMUSCULAR; INTRAVENOUS at 02:08

## 2017-08-08 NOTE — PROGRESS NOTES
"Routine Office Visit    Patient Name: Marycarmen Jj    : 1995  MRN: [id number]    Subjective:  Marycarmen is a 22 y.o. male who presents today for:    1.  He states that he may have been exposed to gonorrhea by his boyfriend.  He denies any symptoms - no rashes.  His partner tested positive for gonorrhea recently.  Patient was tested 3 months ago and was negative. Patient receives anal sex.  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  He would like to be evaluated and treated today for his exposure. He continues to be sexually active with his boyfriend only. He's "not sure why - maybe because he's under a lot of stress at work" regarding other partners.  He hasn't talked to his partner about it "in a serious way."    Past Medical History  History reviewed. No pertinent past medical history.    Past Surgical History  History reviewed. No pertinent surgical history.    Family History  Family History   Problem Relation Age of Onset    No Known Problems Mother     Arthritis Father     Hypertension Maternal Uncle        Social History  Social History     Social History    Marital status: Single     Spouse name: N/A    Number of children: N/A    Years of education: N/A     Occupational History    Not on file.     Social History Main Topics    Smoking status: Never Smoker    Smokeless tobacco: Never Used    Alcohol use Not on file    Drug use: Unknown    Sexual activity: Not on file     Other Topics Concern    Not on file     Social History Narrative    No narrative on file       Current Medications  Current Outpatient Prescriptions on File Prior to Visit   Medication Sig Dispense Refill    [DISCONTINUED] cetirizine (ZYRTEC) 10 MG tablet Take 1 tablet (10 mg total) by mouth once daily. 90 tablet 1    [DISCONTINUED] docusate sodium (COLACE) 100 MG capsule Take 1 capsule (100 mg total) by mouth 2 (two) times daily. 100 capsule 0    [DISCONTINUED] oxycodone " "(ROXICODONE) 5 MG immediate release tablet Take 1 tablet (5 mg total) by mouth every 4 (four) hours as needed for Pain. 80 tablet 0    [DISCONTINUED] polyethylene glycol (MIRALAX) 17 gram/dose powder Take 17 g by mouth once daily. 850 g 0     No current facility-administered medications on file prior to visit.        Allergies   Review of patient's allergies indicates:  No Known Allergies    Review of Systems (Pertinent positives)  Constititutional: Weight loss, excess fatigue, chills, fever, night sweats, weakness, loss of appetite  Ears: Earache, ringing in ears, discharge, hearing loss, hearing aid, popping, infection Nose: stuffy nose, mouth breathing, post-nasal drip,   Lungs: Cough, sputum, wheeze, frequent URI, SOA, Asthma  Heart: Chest pain, angina, palpitations, extra heart beats  Stomach/Intestine: Heartburn, Nausea, vomiting, diarrhea, indigestion, bloating, constipation  Bones/Muscles/Joints: Joint pain, varicose veins, deformities, back pain, swelling, stiffness  Brain: Numbness, tingling, tremor, fainting, headaches, muscle weakness, frequent falls    /72 (BP Location: Right arm, Patient Position: Sitting, BP Method: Manual)   Pulse 87   Temp 98.6 °F (37 °C) (Oral)   Resp 20   Ht 6' 1" (1.854 m)   Wt 78.5 kg (173 lb)   SpO2 97%   BMI 22.82 kg/m²     GENERAL APPEARANCE: in no apparent distress and well developed and well nourished  HEENT: PERRLA, EOMI, Sclera clear, anicteric, Oropharynx clear, no lesions, Neck supple with midline trachea  NECK: normal, supple, no adenopathy, thyroid normal in size  RESPIRATORY: appears well, vitals normal, no respiratory distress, acyanotic, normal RR, chest clear, no wheezing, crepitations, rhonchi, normal symmetric air entry  HEART: regular rate and rhythm, S1, S2 normal, no murmur, click, rub or gallop.    NEUROLOGIC: normal without focal findings, CN II-XII are intact.    Extremities: warm/well perfused.  No abnormal hair patterns.  No clubbing, " cyanosis or edema.    SKIN: no rashes, no wounds, no other lesions  PSYCH: Alert, oriented x 3, thought content appropriate, speech normal, pleasant and cooperative, good eye contact, well groomed, recall good, concentration/judgement good and apparently average intelligence.    Assessment/Plan:  Marycarmen Jj is a 22 y.o. male who presents today for :    Marycarmen was seen today for exposure to std.    Diagnoses and all orders for this visit:    Exposure to gonorrhea  -     azithromycin (Z-NAYA) 250 MG tablet; Take 4 tablets one time.  -     cefTRIAXone injection 250 mg; Inject 250 mg into the muscle one time.  -     Regardless of testing results would want to treat for exposure to definite gonorrhea diagnosis in partner.  Discussed high risk sexual practices of partner put patient at risk.  Continue yearly testing for STD's.      F/u 1-2 months - retesting for STDs.

## 2017-08-08 NOTE — PROGRESS NOTES
Patient tolerate Rocephin 250 mg IM  injection. Patient advise to wait 15 min after injection  for assessment of any posssible side effects.

## 2017-09-25 ENCOUNTER — OFFICE VISIT (OUTPATIENT)
Dept: SURGERY | Facility: CLINIC | Age: 22
End: 2017-09-25
Payer: COMMERCIAL

## 2017-09-25 VITALS
BODY MASS INDEX: 23.37 KG/M2 | HEIGHT: 73 IN | HEART RATE: 86 BPM | DIASTOLIC BLOOD PRESSURE: 94 MMHG | SYSTOLIC BLOOD PRESSURE: 150 MMHG | WEIGHT: 176.38 LBS

## 2017-09-25 DIAGNOSIS — Z87.19 HISTORY OF ANAL DYSPLASIA: Primary | ICD-10-CM

## 2017-09-25 PROCEDURE — 46600 DIAGNOSTIC ANOSCOPY SPX: CPT | Mod: S$GLB,,, | Performed by: COLON & RECTAL SURGERY

## 2017-09-25 PROCEDURE — 99999 PR PBB SHADOW E&M-EST. PATIENT-LVL III: CPT | Mod: PBBFAC,,, | Performed by: COLON & RECTAL SURGERY

## 2017-09-25 PROCEDURE — 3008F BODY MASS INDEX DOCD: CPT | Mod: S$GLB,,, | Performed by: COLON & RECTAL SURGERY

## 2017-09-25 PROCEDURE — 99213 OFFICE O/P EST LOW 20 MIN: CPT | Mod: 25,S$GLB,, | Performed by: COLON & RECTAL SURGERY

## 2017-09-25 NOTE — LETTER
October 2, 2017        Felicity Berry MD  [address] [facility]  [place] [address]             [facility]  [address]  [facility] [address]  Phone: [phone]   Patient: Marycarmen Jj   MR Number: [id number]   YOB: 1995   Date of Visit: 9/25/2017       Dear Dr. Berry:    Thank you for referring Marycarmen Jj to me for evaluation. Attached you will find relevant portions of my assessment and plan of care.    If you have questions, please do not hesitate to call me. I look forward to following Marycarmen Jj along with you.    Sincerely,      Mark Bella MD            CC  No Recipients    Enclosure

## 2017-09-25 NOTE — PROGRESS NOTES
Subjective:       Patient ID: Marycaremn Jj is a 22 y.o. male.    Chief Complaint: Follow-up    HPI 23 yo M referred following abnormal anal PAP smear.  Anal PAP done by PCP 5/10/17 - Low-grade squamous intraepithelial lesion (LSIL).  +MSM, +anal receptive intercourse, in a monagamous relationship.  HIV negative, no high risk behavior.  No significant pain with BM's or intercourse but does have occasional bleeding after BM's.  Taken to OR 6/16/17 for fulgaration/excision of anal condyloma.  Found to have near-circumferential internal lesions at level of anal transition zone.  Path showed LGD.    He returns today for surveillance exam. No new complaints.  No anal pain or bleeding.      Last colonoscopy - Never  No family hx of CRC or IBD.      Review of patient's allergies indicates:  No Known Allergies    History reviewed. No pertinent past medical history.    History reviewed. No pertinent surgical history.    No current outpatient prescriptions on file.     No current facility-administered medications for this visit.        Family History   Problem Relation Age of Onset    No Known Problems Mother     Arthritis Father     Hypertension Maternal Uncle        Social History     Social History    Marital status: Single     Spouse name: N/A    Number of children: N/A    Years of education: N/A     Social History Main Topics    Smoking status: Never Smoker    Smokeless tobacco: Never Used    Alcohol use None    Drug use: Unknown    Sexual activity: Not Asked     Other Topics Concern    None     Social History Narrative    None       Review of Systems   Constitutional: Negative for chills and fever.   HENT: Negative for congestion and sinus pressure.    Eyes: Negative for visual disturbance.   Respiratory: Negative for cough and shortness of breath.    Cardiovascular: Negative for chest pain and palpitations.   Gastrointestinal: Negative for abdominal distention, abdominal pain, anal bleeding, blood  in stool, constipation, diarrhea, nausea, rectal pain and vomiting.   Endocrine: Negative for cold intolerance and heat intolerance.   Genitourinary: Negative for dysuria and frequency.   Musculoskeletal: Negative for arthralgias and back pain.   Skin: Negative for rash.   Allergic/Immunologic: Negative for immunocompromised state.   Neurological: Negative for dizziness, light-headedness and headaches.   Psychiatric/Behavioral: Negative for confusion. The patient is not nervous/anxious.        Objective:      Physical Exam   Constitutional: He is oriented to person, place, and time. He appears well-developed and well-nourished.   HENT:   Head: Normocephalic and atraumatic.   Eyes: Conjunctivae are normal.   Pulmonary/Chest: Effort normal. No respiratory distress.   Abdominal: Soft. Bowel sounds are normal. He exhibits no distension and no mass. There is no tenderness. There is no rebound and no guarding.   Genitourinary:   Genitourinary Comments: Perineum - normal perianal skin, no mass, no fissure, no external hemorrhoids, no external condyloma  DEVEN - good tone, no mass  Anoscopy - no internal condyloma, no significant internal hemorrhoids     Neurological: He is alert and oriented to person, place, and time.   Skin: Skin is warm and dry. No erythema.         Lab Results   Component Value Date    WBC 6.85 05/09/2017    HGB 15.6 05/09/2017    HCT 45.5 05/09/2017    MCV 85 05/09/2017     (H) 05/09/2017     BMP  Lab Results   Component Value Date     05/09/2017    K 4.2 05/09/2017     05/09/2017    CO2 30 (H) 05/09/2017    BUN 11 05/09/2017    CREATININE 0.9 05/09/2017    CALCIUM 9.8 05/09/2017    ANIONGAP 6 (L) 05/09/2017    ESTGFRAFRICA >60 05/09/2017    EGFRNONAA >60 05/09/2017     CMP  Sodium   Date Value Ref Range Status   05/09/2017 141 136 - 145 mmol/L Final     Potassium   Date Value Ref Range Status   05/09/2017 4.2 3.5 - 5.1 mmol/L Final     Chloride   Date Value Ref Range Status    05/09/2017 105 95 - 110 mmol/L Final     CO2   Date Value Ref Range Status   05/09/2017 30 (H) 23 - 29 mmol/L Final     Glucose   Date Value Ref Range Status   05/09/2017 82 70 - 110 mg/dL Final     BUN, Bld   Date Value Ref Range Status   05/09/2017 11 6 - 20 mg/dL Final     Creatinine   Date Value Ref Range Status   05/09/2017 0.9 0.5 - 1.4 mg/dL Final     Calcium   Date Value Ref Range Status   05/09/2017 9.8 8.7 - 10.5 mg/dL Final     Total Protein   Date Value Ref Range Status   05/09/2017 8.0 6.0 - 8.4 g/dL Final     Albumin   Date Value Ref Range Status   05/09/2017 4.3 3.5 - 5.2 g/dL Final     Total Bilirubin   Date Value Ref Range Status   05/09/2017 1.1 (H) 0.1 - 1.0 mg/dL Final     Comment:     For infants and newborns, interpretation of results should be based  on gestational age, weight and in agreement with clinical  observations.  Premature Infant recommended reference ranges:  Up to 24 hours.............<8.0 mg/dL  Up to 48 hours............<12.0 mg/dL  3-5 days..................<15.0 mg/dL  6-29 days.................<15.0 mg/dL       Alkaline Phosphatase   Date Value Ref Range Status   05/09/2017 74 55 - 135 U/L Final     AST   Date Value Ref Range Status   05/09/2017 16 10 - 40 U/L Final     ALT   Date Value Ref Range Status   05/09/2017 16 10 - 44 U/L Final     Anion Gap   Date Value Ref Range Status   05/09/2017 6 (L) 8 - 16 mmol/L Final     eGFR if    Date Value Ref Range Status   05/09/2017 >60 >60 mL/min/1.73 m^2 Final     eGFR if non    Date Value Ref Range Status   05/09/2017 >60 >60 mL/min/1.73 m^2 Final     Comment:     Calculation used to obtain the estimated glomerular filtration  rate (eGFR) is the CKD-EPI equation. Since race is unknown   in our information system, the eGFR values for   -American and Non--American patients are given   for each creatinine result.       No results found for: CEA    Assessment:       1. History of anal  dysplasia      Normal surveillance anoscopy today    Plan:   RTO for surveillance anoscopy 3 months    Mark Bella MD, FACS, FASCRS  Staff Surgeon  Department of Colon & Rectal Surgery

## 2017-11-13 ENCOUNTER — OFFICE VISIT (OUTPATIENT)
Dept: FAMILY MEDICINE | Facility: CLINIC | Age: 22
End: 2017-11-13
Payer: COMMERCIAL

## 2017-11-13 ENCOUNTER — LAB VISIT (OUTPATIENT)
Dept: LAB | Facility: HOSPITAL | Age: 22
End: 2017-11-13
Attending: FAMILY MEDICINE
Payer: COMMERCIAL

## 2017-11-13 VITALS
RESPIRATION RATE: 20 BRPM | OXYGEN SATURATION: 98 % | BODY MASS INDEX: 23.03 KG/M2 | HEIGHT: 72 IN | DIASTOLIC BLOOD PRESSURE: 64 MMHG | TEMPERATURE: 98 F | WEIGHT: 170 LBS | HEART RATE: 73 BPM | SYSTOLIC BLOOD PRESSURE: 130 MMHG

## 2017-11-13 DIAGNOSIS — Z11.3 SCREENING EXAMINATION FOR STD (SEXUALLY TRANSMITTED DISEASE): Primary | ICD-10-CM

## 2017-11-13 DIAGNOSIS — Z72.51 HIGH RISK SEXUAL BEHAVIOR: ICD-10-CM

## 2017-11-13 DIAGNOSIS — Z11.3 SCREENING EXAMINATION FOR STD (SEXUALLY TRANSMITTED DISEASE): ICD-10-CM

## 2017-11-13 PROCEDURE — 87591 N.GONORRHOEAE DNA AMP PROB: CPT

## 2017-11-13 PROCEDURE — 86703 HIV-1/HIV-2 1 RESULT ANTBDY: CPT

## 2017-11-13 PROCEDURE — 86706 HEP B SURFACE ANTIBODY: CPT

## 2017-11-13 PROCEDURE — 86592 SYPHILIS TEST NON-TREP QUAL: CPT

## 2017-11-13 PROCEDURE — 99213 OFFICE O/P EST LOW 20 MIN: CPT | Mod: S$GLB,,, | Performed by: FAMILY MEDICINE

## 2017-11-13 PROCEDURE — 36415 COLL VENOUS BLD VENIPUNCTURE: CPT | Mod: PN

## 2017-11-13 PROCEDURE — 86803 HEPATITIS C AB TEST: CPT

## 2017-11-13 PROCEDURE — 99999 PR PBB SHADOW E&M-EST. PATIENT-LVL III: CPT | Mod: PBBFAC,,, | Performed by: FAMILY MEDICINE

## 2017-11-13 RX ORDER — CLINDAMYCIN AND BENZOYL PEROXIDE 10; 50 MG/G; MG/G
GEL TOPICAL
COMMUNITY
Start: 2017-11-06 | End: 2018-04-25

## 2017-11-13 RX ORDER — TRIAMCINOLONE ACETONIDE 1 MG/G
OINTMENT TOPICAL
COMMUNITY
Start: 2017-11-06 | End: 2018-04-25

## 2017-11-13 NOTE — PROGRESS NOTES
Routine Office Visit    Patient Name: Marycarmen Jj    : 1995  MRN: [id number]    Subjective:  Marycarmen is a 22 y.o. male who presents today for:    1.  STD check - he is currently single, he broke up with male partner 4 months ago.  He has had 10 male partners since then, receptive anal intercourse. All have had no history of HIV.  1-2 occasions without condoms, otherwise partner used condoms.  Single partners, no group encounters.  Not taking drugs.  No history of STD positive partners.  He was monogamous with his previous partner.  He is interested in Prep medication.  He would like to learn more about side effects and get STD screening today.  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        Past Medical History  History reviewed. No pertinent past medical history.    Past Surgical History  History reviewed. No pertinent surgical history.    Family History  Family History   Problem Relation Age of Onset    No Known Problems Mother     Arthritis Father     Hypertension Maternal Uncle        Social History  Social History     Social History    Marital status: Single     Spouse name: N/A    Number of children: N/A    Years of education: N/A     Occupational History    Not on file.     Social History Main Topics    Smoking status: Never Smoker    Smokeless tobacco: Never Used    Alcohol use Not on file    Drug use: Unknown    Sexual activity: Not on file     Other Topics Concern    Not on file     Social History Narrative    No narrative on file       Current Medications  No current outpatient prescriptions on file prior to visit.     No current facility-administered medications on file prior to visit.        Allergies   Review of patient's allergies indicates:  No Known Allergies    Review of Systems (Pertinent positives)  Constititutional: Weight loss, excess fatigue, chills, fever, night sweats, weakness,  loss of appetite  Skin: Rash, itching, lesions, color changes  Ears: Earache, ringing in ears, discharge, hearing loss, hearing aid, popping, infection Nose: stuffy nose, mouth breathing, post-nasal drip,   Lungs: Cough, sputum, wheeze, frequent URI, SOA, Asthma  Heart: Chest pain, angina, palpitations, extra heart beats  Stomach/Intestine: Heartburn, Nausea, vomiting, diarrhea, indigestion, bloating, constipation  Bones/Muscles/Joints: Joint pain, deformities, back pain, swelling, stiffness  Brain: Numbness, tingling, tremor, fainting, headaches, muscle weakness, frequent falls    /64 (BP Location: Left arm, Patient Position: Sitting, BP Method: Medium (Automatic))   Pulse 73   Temp 98.2 °F (36.8 °C) (Oral)   Resp 20   Ht 6' (1.829 m)   Wt 77.1 kg (169 lb 15.6 oz)   SpO2 98%   BMI 23.05 kg/m²     GENERAL APPEARANCE: in no apparent distress and well developed and well nourished  HEENT: PERRLA, EOMI, Sclera clear, anicteric, Oropharynx clear, no lesions, Neck supple with midline trachea  NECK: normal, supple, no adenopathy, thyroid normal in size  RESPIRATORY: appears well, vitals normal, no respiratory distress, acyanotic, normal RR, chest clear, no wheezing, crepitations, rhonchi, normal symmetric air entry  HEART: regular rate and rhythm, S1, S2 normal, no murmur, click, rub or gallop.    NEUROLOGIC: normal without focal findings, CN II-XII are intact.  Extremities: warm/well perfused.  No abnormal hair patterns.  No clubbing, cyanosis or edema.   SKIN: no rashes, no wounds, no other lesions  PSYCH: Alert, oriented x 3, thought content appropriate, speech normal, pleasant and cooperative, good eye contact, well groomed, recall good, concentration/judgement good and apparently average intelligence.    Assessment/Plan:  Marycarmen Jj is a 22 y.o. male who presents today for :    Marycarmen was seen today for std screening.    Diagnoses and all orders for this visit:    Screening examination for STD  (sexually transmitted disease)  -     HIV-1 and HIV-2 antibodies; Future  -     RPR; Future  -     C. trachomatis/N. gonorrhoeae by AMP DNA Urine  -     Hepatitis B surface antibody; Future  -     Hepatitis C antibody; Future    High risk sexual behavior        -    Discussed safe sex practices        -    Getting STD screening today, if all is negative, and normal kidney function, patient is acceptable candidate for prep initiation. Patient was told about visits q3 months with HIV testing.  Side effects were reviewed, patient handout was given        -    Patient understands that all his testing has to come back before treatment is initiated.    F/u 3 months - PreP      Answers for HPI/ROS submitted by the patient on 11/8/2017   activity change: No  unexpected weight change: No  neck pain: No  hearing loss: No  rhinorrhea: No  trouble swallowing: No  eye discharge: No  visual disturbance: No  chest tightness: No  wheezing: No  chest pain: No  palpitations: No  blood in stool: No  constipation: No  vomiting: No  diarrhea: No  polydipsia: No  polyuria: No  difficulty urinating: No  urgency: No  hematuria: No  joint swelling: No  arthralgias: No  headaches: No  weakness: No  confusion: No  dysphoric mood: No

## 2017-11-14 ENCOUNTER — PATIENT MESSAGE (OUTPATIENT)
Dept: FAMILY MEDICINE | Facility: CLINIC | Age: 22
End: 2017-11-14

## 2017-11-14 DIAGNOSIS — Z72.51 HIGH RISK SEXUAL BEHAVIOR: Primary | ICD-10-CM

## 2017-11-14 DIAGNOSIS — Z91.89 AT RISK FOR HIV DUE TO HOMOSEXUAL CONTACT: ICD-10-CM

## 2017-11-14 LAB
C TRACH DNA SPEC QL NAA+PROBE: NOT DETECTED
HBV SURFACE AB SER-ACNC: NEGATIVE M[IU]/ML
HCV AB SERPL QL IA: NEGATIVE
HIV 1+2 AB+HIV1 P24 AG SERPL QL IA: NEGATIVE
N GONORRHOEA DNA SPEC QL NAA+PROBE: NOT DETECTED
RPR SER QL: NORMAL

## 2017-11-14 RX ORDER — EMTRICITABINE AND TENOFOVIR DISOPROXIL FUMARATE 200; 300 MG/1; MG/1
1 TABLET, FILM COATED ORAL DAILY
Qty: 90 TABLET | Refills: 0 | Status: SHIPPED | OUTPATIENT
Start: 2017-11-14 | End: 2018-03-08 | Stop reason: SDUPTHER

## 2018-01-05 ENCOUNTER — PATIENT MESSAGE (OUTPATIENT)
Dept: SURGERY | Facility: CLINIC | Age: 23
End: 2018-01-05

## 2018-03-07 ENCOUNTER — PATIENT OUTREACH (OUTPATIENT)
Dept: INTERNAL MEDICINE | Facility: CLINIC | Age: 23
End: 2018-03-07

## 2018-03-07 NOTE — PROGRESS NOTES
[facility] is committed to your overall health.  To help you get the most out of each of your visits, we will review your information to make sure you are up to date on all of your recommended tests and/or procedures.       Your PCP     found that you may be due for:       Health Maintenance Due   Topic Date Due    Influenza Vaccine  08/01/2017             If you have had any of the above done at another facility, please bring the records or information with you so that your record at [facility] will be complete.  If you would like to schedule any of these, please contact me.     If you are currently taking medication, please bring it with you to your appointment for review.     Also, if you have any type of Advanced Directives, please bring them with you to your office visit so we may scan them into your chart.       Thank you for Choosing [facility] for your healthcare needs.        Additional Information  If you have questions, you can email [email or web address] or call [phone]  to talk to our [facility] staff. Remember, [facility] is NOT to be used for urgent needs. For medical emergencies, dial 911.

## 2018-03-08 ENCOUNTER — OFFICE VISIT (OUTPATIENT)
Dept: INTERNAL MEDICINE | Facility: CLINIC | Age: 23
End: 2018-03-08
Attending: INTERNAL MEDICINE
Payer: COMMERCIAL

## 2018-03-08 ENCOUNTER — LAB VISIT (OUTPATIENT)
Dept: LAB | Facility: OTHER | Age: 23
End: 2018-03-08
Attending: INTERNAL MEDICINE
Payer: COMMERCIAL

## 2018-03-08 VITALS
WEIGHT: 172.38 LBS | HEIGHT: 72 IN | SYSTOLIC BLOOD PRESSURE: 128 MMHG | BODY MASS INDEX: 23.35 KG/M2 | DIASTOLIC BLOOD PRESSURE: 62 MMHG | HEART RATE: 94 BPM

## 2018-03-08 DIAGNOSIS — R53.83 FATIGUE, UNSPECIFIED TYPE: ICD-10-CM

## 2018-03-08 DIAGNOSIS — Z91.89 AT RISK FOR SEXUALLY TRANSMITTED DISEASE DUE TO UNPROTECTED SEX: ICD-10-CM

## 2018-03-08 DIAGNOSIS — Z11.3 SCREEN FOR STD (SEXUALLY TRANSMITTED DISEASE): Primary | ICD-10-CM

## 2018-03-08 DIAGNOSIS — Z91.89 AT RISK FOR SIDE EFFECT OF MEDICATION: ICD-10-CM

## 2018-03-08 DIAGNOSIS — Z91.89 AT RISK FOR HIV DUE TO HOMOSEXUAL CONTACT: ICD-10-CM

## 2018-03-08 DIAGNOSIS — Z72.51 HIGH RISK SEXUAL BEHAVIOR: ICD-10-CM

## 2018-03-08 DIAGNOSIS — Z11.3 SCREEN FOR STD (SEXUALLY TRANSMITTED DISEASE): ICD-10-CM

## 2018-03-08 DIAGNOSIS — A63.0 ANAL CONDYLOMA: ICD-10-CM

## 2018-03-08 LAB
25(OH)D3+25(OH)D2 SERPL-MCNC: 16 NG/ML
ALBUMIN SERPL BCP-MCNC: 4.2 G/DL
ALP SERPL-CCNC: 75 U/L
ALT SERPL W/O P-5'-P-CCNC: 20 U/L
ANION GAP SERPL CALC-SCNC: 8 MMOL/L
AST SERPL-CCNC: 17 U/L
BASOPHILS # BLD AUTO: 0.03 K/UL
BASOPHILS NFR BLD: 0.4 %
BILIRUB SERPL-MCNC: 0.7 MG/DL
BUN SERPL-MCNC: 12 MG/DL
CALCIUM SERPL-MCNC: 9.1 MG/DL
CHLORIDE SERPL-SCNC: 104 MMOL/L
CO2 SERPL-SCNC: 26 MMOL/L
CREAT SERPL-MCNC: 0.8 MG/DL
DIFFERENTIAL METHOD: NORMAL
EOSINOPHIL # BLD AUTO: 0.2 K/UL
EOSINOPHIL NFR BLD: 2.3 %
ERYTHROCYTE [DISTWIDTH] IN BLOOD BY AUTOMATED COUNT: 13.6 %
EST. GFR  (AFRICAN AMERICAN): >60 ML/MIN/1.73 M^2
EST. GFR  (NON AFRICAN AMERICAN): >60 ML/MIN/1.73 M^2
GLUCOSE SERPL-MCNC: 77 MG/DL
HCT VFR BLD AUTO: 42.5 %
HGB BLD-MCNC: 14.2 G/DL
LYMPHOCYTES # BLD AUTO: 2 K/UL
LYMPHOCYTES NFR BLD: 24.3 %
MCH RBC QN AUTO: 29.3 PG
MCHC RBC AUTO-ENTMCNC: 33.4 G/DL
MCV RBC AUTO: 88 FL
MONOCYTES # BLD AUTO: 0.9 K/UL
MONOCYTES NFR BLD: 10.4 %
NEUTROPHILS # BLD AUTO: 5.2 K/UL
NEUTROPHILS NFR BLD: 62.4 %
PLATELET # BLD AUTO: 276 K/UL
PMV BLD AUTO: 10.6 FL
POTASSIUM SERPL-SCNC: 3.9 MMOL/L
PROT SERPL-MCNC: 7.2 G/DL
RBC # BLD AUTO: 4.85 M/UL
RPR SER QL: NORMAL
SODIUM SERPL-SCNC: 138 MMOL/L
TSH SERPL DL<=0.005 MIU/L-ACNC: 1.92 UIU/ML
WBC # BLD AUTO: 8.34 K/UL

## 2018-03-08 PROCEDURE — 87591 N.GONORRHOEAE DNA AMP PROB: CPT

## 2018-03-08 PROCEDURE — 82306 VITAMIN D 25 HYDROXY: CPT

## 2018-03-08 PROCEDURE — 86803 HEPATITIS C AB TEST: CPT

## 2018-03-08 PROCEDURE — 87340 HEPATITIS B SURFACE AG IA: CPT

## 2018-03-08 PROCEDURE — 99999 PR PBB SHADOW E&M-EST. PATIENT-LVL III: CPT | Mod: PBBFAC,,, | Performed by: INTERNAL MEDICINE

## 2018-03-08 PROCEDURE — 99214 OFFICE O/P EST MOD 30 MIN: CPT | Mod: S$GLB,,, | Performed by: INTERNAL MEDICINE

## 2018-03-08 PROCEDURE — 36415 COLL VENOUS BLD VENIPUNCTURE: CPT

## 2018-03-08 PROCEDURE — 86592 SYPHILIS TEST NON-TREP QUAL: CPT

## 2018-03-08 PROCEDURE — 80053 COMPREHEN METABOLIC PANEL: CPT

## 2018-03-08 PROCEDURE — 85025 COMPLETE CBC W/AUTO DIFF WBC: CPT

## 2018-03-08 PROCEDURE — 84443 ASSAY THYROID STIM HORMONE: CPT

## 2018-03-08 PROCEDURE — 86703 HIV-1/HIV-2 1 RESULT ANTBDY: CPT

## 2018-03-08 PROCEDURE — 87491 CHLMYD TRACH DNA AMP PROBE: CPT | Mod: 91

## 2018-03-08 RX ORDER — EMTRICITABINE AND TENOFOVIR DISOPROXIL FUMARATE 200; 300 MG/1; MG/1
1 TABLET, FILM COATED ORAL DAILY
Qty: 90 TABLET | Refills: 0 | Status: SHIPPED | OUTPATIENT
Start: 2018-03-08 | End: 2018-06-12

## 2018-03-08 NOTE — PATIENT INSTRUCTIONS
1)Antihistamines(Allegra, Claritin, Xzyal, Zyrtec)  2)Nasal Steroids (Nasocort, Rhinocort, Flonase)  3)Distilled salt water sinus rinses via neti pots or products such as Kev Med Sinus Rinse or Sinugator. Must wash container or device and use bottled water to avoid introducing infection.   You can can use (as directed) any combination of these three things every day of your life if needed in order to treat or control your symptoms. Brand name use of medications is not necessary

## 2018-03-08 NOTE — PROGRESS NOTES
Subjective:       Patient ID: Marycarmen Jj is a 22 y.o. male.    Chief Complaint: Annual Exam    Here to establish care    21 yo M c/ PMHx of anal condyloma with LGD excised 06/2017 presents today to establish care along with STD screening. MSM, recently out of monogamous relationship this past year. Here today due to sexual partner whom he had unprotected sex with approx 1 month prior contact him 3-4 days ago stating he had tested + for chlamydia. Of note he was also Rx PrEP but was unable to start due to exuberant co-pay. Multiple partners and does not use condoms all the time. He is a candidate for triple site testing.         Review of Systems   Constitutional: Negative for activity change and unexpected weight change.   HENT: Negative for hearing loss, rhinorrhea and trouble swallowing.    Eyes: Negative for discharge and visual disturbance.   Respiratory: Negative for chest tightness and wheezing.    Cardiovascular: Negative for chest pain and palpitations.   Gastrointestinal: Negative for blood in stool, constipation, diarrhea and vomiting.   Endocrine: Negative for polydipsia and polyuria.   Genitourinary: Negative for difficulty urinating, hematuria and urgency.   Musculoskeletal: Negative for arthralgias, joint swelling and neck pain.   Neurological: Positive for headaches (frequent tension HA related to neck tension ). Negative for weakness.   Psychiatric/Behavioral: Negative for confusion and dysphoric mood.       History reviewed. No pertinent past medical history.  History reviewed. No pertinent surgical history.  Family History   Problem Relation Age of Onset    No Known Problems Mother     Arthritis Father     Hypertension Maternal Uncle      Social History     Social History    Marital status: Single     Spouse name: N/A    Number of children: N/A    Years of education: N/A     Occupational History    Not on file.     Social History Main Topics    Smoking status: Never Smoker     Smokeless tobacco: Never Used    Alcohol use Not on file    Drug use: Unknown    Sexual activity: Not on file     Other Topics Concern    Not on file     Social History Narrative    No narrative on file         Objective:      Vitals:    03/08/18 0801   BP: 128/62   Pulse: 94   Weight: 78.2 kg (172 lb 6.4 oz)   Height: 6' (1.829 m)      Physical Exam   Constitutional: He is oriented to person, place, and time. He appears well-developed and well-nourished. No distress.   HENT:   Head: Normocephalic and atraumatic.   Mouth/Throat: Oropharynx is clear and moist. No oropharyngeal exudate.   Eyes: Conjunctivae and EOM are normal. Pupils are equal, round, and reactive to light. No scleral icterus.   Neck: No thyromegaly present.   Cardiovascular: Normal rate, regular rhythm and normal heart sounds.    No murmur heard.  Pulmonary/Chest: Effort normal and breath sounds normal. He has no wheezes. He has no rales.   Abdominal: Soft. He exhibits no distension. There is no tenderness.   Musculoskeletal: He exhibits no edema or tenderness.   Lymphadenopathy:     He has no cervical adenopathy.   Neurological: He is alert and oriented to person, place, and time.   Skin: Skin is warm and dry.   Psychiatric: He has a normal mood and affect. His behavior is normal.       Assessment:       1. Screen for STD (sexually transmitted disease)    2. At risk for sexually transmitted disease due to unprotected sex    3. At risk for side effect of medication    4. Anal condyloma    5. Fatigue, unspecified type        Plan:       Marycarmen was seen today for annual exam.    Diagnoses and all orders for this visit:    Screen for STD (sexually transmitted disease)  -     HIV-1 and HIV-2 antibodies; Future  -     RPR; Future  -     C. trachomatis/N. gonorrhoeae by AMP DNA Urine; Future  -     Hepatitis C antibody; Future  -     Hepatitis B surface antigen; Future    At risk for sexually transmitted disease due to unprotected sex  -     HIV-1 and  HIV-2 antibodies; Future  -     RPR; Future  -     C. trachomatis/N. gonorrhoeae by AMP DNA Urine; Future  -     Hepatitis C antibody; Future  -     Hepatitis B surface antigen; Future  -     N.gonorroheae, Miscellaneous site, (AMP RNA) Rectal; Future  -     C. trachomatis, Miscellaneous site, (AMP RNA) Rectal; Future  -     N.gonorroheae, Miscellaneous site, (AMP RNA) Throat; Future    At risk for side effect of medication  -     Vitamin D; Future  -     Comprehensive metabolic panel; Future    Anal condyloma  -f/u with colorectal for surveillance anoscopy 2/2 LGD    Fatigue, unspecified type  -     CBC auto differential; Future  -     TSH; Future                Notification of Lab Results: [facility]  Side effects of medication(s) were discussed in detail and patient voiced understanding.  Patient will call back for any issues or complications.

## 2018-03-09 ENCOUNTER — TELEPHONE (OUTPATIENT)
Dept: PHARMACY | Facility: CLINIC | Age: 23
End: 2018-03-09

## 2018-03-09 LAB
C TRACH DNA SPEC QL NAA+PROBE: NOT DETECTED
HBV SURFACE AG SERPL QL IA: NEGATIVE
HCV AB SERPL QL IA: NEGATIVE
HIV 1+2 AB+HIV1 P24 AG SERPL QL IA: NEGATIVE
N GONORRHOEA DNA SPEC QL NAA+PROBE: NOT DETECTED

## 2018-03-12 LAB
N.GONORROHEAE, AMP RNA SOURCE: NORMAL
N.GONORROHEAE, MISC. AMP RNA: NEGATIVE

## 2018-03-12 NOTE — TELEPHONE ENCOUNTER
FOR DOCUMENTATION ONLY:  Financial Assistance for Truvada approved from 03/12/2018 to 12/31/2018  Source : Copay Card  BIN: [id number]  PCN: [employer]  Id: [id number]  GRP: [id number]  Forwarded to the clinical pharmacist for consult and shipment.  CYNDI

## 2018-03-12 NOTE — TELEPHONE ENCOUNTER
DOCUMENTATION ONLY  FYI  Truvada does not require a prior authorization through the patient's insurance.    Copay: $346.24    Patient Assistance IS required and is being researched [facility]

## 2018-03-13 ENCOUNTER — TELEPHONE (OUTPATIENT)
Dept: PHARMACY | Facility: CLINIC | Age: 23
End: 2018-03-13

## 2018-03-13 DIAGNOSIS — Z11.3 SCREENING FOR STD (SEXUALLY TRANSMITTED DISEASE): Primary | ICD-10-CM

## 2018-03-13 DIAGNOSIS — Z72.51 HIGH RISK SEXUAL BEHAVIOR: ICD-10-CM

## 2018-03-13 NOTE — TELEPHONE ENCOUNTER
Patient plans to start Truvada on 3/13/18.  Truvada counseling complete. Name/ confirmed. Consult included: indication; goals of treatment; administration; storage and handling; side effects; how to handle missed doses; the importance of compliance; the importance of maintaining lab appts and follow up appts w\ MD; safe sex practices with condoms. Patient understands that medication alone is not primary HIV prophylaxis. Patient understands this medication will only serve as protection for HIV and no other STDs. Patient understands to contact [facility] and MD for any medication changes due to drug interactions. He understands the importance of 3month regular testing. He understands the importance of ONCE daily dosing due to the potential renal effects of tenofovir. All questions answered and addressed to the patients satisfaction.       Discussed the importance of staying well hydrated while on therapy. Compliance stressed - patient to take missed doses as soon as remembered, but NOT to take 2 doses in one day. Patient will report questions or concerns to myself or practitioner. Patient verbalizes understanding. I will personally f/u with patient in 2 weeks from start, and [facility] will contact patient in 3 weeks to coordinate next refill.        Patient plans to start Truvada on 3/16/2018. (please schedule labs accordingly)  Approximately 10 mins spent with the patient on medication education.      Thank you for allowing us to participate in the care of your patient

## 2018-03-21 ENCOUNTER — TELEPHONE (OUTPATIENT)
Dept: PHARMACY | Facility: CLINIC | Age: 23
End: 2018-03-21

## 2018-03-21 NOTE — TELEPHONE ENCOUNTER
Contacting patient via [employer] for Georgia Hamilton, [email or web address].  Clinical Pharmacist  [facility]  Phone: [phone]

## 2018-03-23 NOTE — TELEPHONE ENCOUNTER
Initial clinical follow-up conducted for Truvada. Name/ confirmed. no missed doses; no new medications; no side effects noted. Patient understands to report any medication changes to [facility] and provider. All questions answered and addressed to patients satisfaction.      Patient claims that he has experienced a small amount of upset stomach.  He is uncertain if this is related to the Truvada or not.  Will attempt taking Truvada with small meal or small amount of food to see if it improves.  Otherwise he does not feel this warrants intervention at this time.     Lanre Hamilton, R.Ph.  Clinical Pharmacist  [facility]  Phone: [phone]

## 2018-04-05 ENCOUNTER — TELEPHONE (OUTPATIENT)
Dept: PHARMACY | Facility: CLINIC | Age: 23
End: 2018-04-05

## 2018-04-05 NOTE — TELEPHONE ENCOUNTER
Patient doing better on Truvada; still some minor stomach issues but patient states he is changing his diet to help with these issues and pain has decreased.  Advised patient to call if issue becomes a problem again.  Patient unsure how many doses he has remaining and requested call back on Monday for refill call.  Patient denies any missed doses.  Patient will call back with any questions.    Buster Wills, PharmD  Clinical Pharmacist  [facility]  [phone]\

## 2018-04-09 ENCOUNTER — TELEPHONE (OUTPATIENT)
Dept: PHARMACY | Facility: CLINIC | Age: 23
End: 2018-04-09

## 2018-04-25 ENCOUNTER — OFFICE VISIT (OUTPATIENT)
Dept: INTERNAL MEDICINE | Facility: CLINIC | Age: 23
End: 2018-04-25
Attending: INTERNAL MEDICINE
Payer: COMMERCIAL

## 2018-04-25 VITALS
WEIGHT: 170 LBS | DIASTOLIC BLOOD PRESSURE: 74 MMHG | SYSTOLIC BLOOD PRESSURE: 110 MMHG | HEART RATE: 70 BPM | BODY MASS INDEX: 21.82 KG/M2 | OXYGEN SATURATION: 98 % | HEIGHT: 74 IN

## 2018-04-25 DIAGNOSIS — Z20.818 EXPOSURE TO STREP THROAT: Primary | ICD-10-CM

## 2018-04-25 PROCEDURE — 99213 OFFICE O/P EST LOW 20 MIN: CPT | Mod: S$GLB,,, | Performed by: INTERNAL MEDICINE

## 2018-04-25 PROCEDURE — 87880 STREP A ASSAY W/OPTIC: CPT | Mod: QW,S$GLB,, | Performed by: INTERNAL MEDICINE

## 2018-04-25 PROCEDURE — 99999 PR PBB SHADOW E&M-EST. PATIENT-LVL III: CPT | Mod: PBBFAC,,, | Performed by: INTERNAL MEDICINE

## 2018-04-25 NOTE — PROGRESS NOTES
"Subjective:       Patient ID: Marycarmen Jj is a 22 y.o. male.    Chief Complaint: Sore Throat    Here for urgent visit    Roommate with strep throat. States he typically does not present with sore throat and instead presents with abd pain.         Review of Systems   Constitutional: Negative for activity change and unexpected weight change.   HENT: Positive for rhinorrhea. Negative for hearing loss and trouble swallowing.    Eyes: Negative for discharge and visual disturbance.   Respiratory: Negative for chest tightness and wheezing.    Cardiovascular: Negative for chest pain and palpitations.   Gastrointestinal: Negative for blood in stool, constipation, diarrhea and vomiting.   Endocrine: Negative for polydipsia and polyuria.   Genitourinary: Negative for difficulty urinating, hematuria and urgency.   Musculoskeletal: Negative for arthralgias, joint swelling and neck pain.   Neurological: Negative for weakness and headaches.   Psychiatric/Behavioral: Negative for confusion and dysphoric mood.       Objective:      Vitals:    04/25/18 0809   BP: 110/74   Pulse: 70   SpO2: 98%   Weight: 77.1 kg (169 lb 15.6 oz)   Height: 6' 1.5" (1.867 m)      Physical Exam   Constitutional: He is oriented to person, place, and time. He appears well-developed and well-nourished.   HENT:   Head: Normocephalic and atraumatic.   Right Ear: Tympanic membrane, external ear and ear canal normal.   Left Ear: Tympanic membrane, external ear and ear canal normal.   Nose: No mucosal edema or rhinorrhea.   Mouth/Throat: No oropharyngeal exudate, posterior oropharyngeal edema or posterior oropharyngeal erythema.   Eyes: Conjunctivae and EOM are normal.   Pulmonary/Chest: Effort normal and breath sounds normal. No respiratory distress. He has no wheezes.   Abdominal: He exhibits no distension.   Musculoskeletal: He exhibits no edema.   Lymphadenopathy:     He has no cervical adenopathy.   Neurological: He is alert and oriented to person, " place, and time.   Skin: Skin is warm and dry. No rash noted.       Assessment:       1. Exposure to strep throat        Plan:       Marycarmen was seen today for sore throat.    Diagnoses and all orders for this visit:    Exposure to strep throat  -     NEGATIVE Strep A culture, throat               Side effects of medication(s) were discussed in detail and patient voiced understanding.  Patient will call back for any issues or complications.

## 2018-04-27 LAB
CTP QC/QA: YES
S PYO RRNA THROAT QL PROBE: NEGATIVE

## 2018-05-01 ENCOUNTER — TELEPHONE (OUTPATIENT)
Dept: PHARMACY | Facility: CLINIC | Age: 23
End: 2018-05-01

## 2018-06-04 DIAGNOSIS — Z91.89 AT RISK FOR HIV DUE TO HOMOSEXUAL CONTACT: ICD-10-CM

## 2018-06-04 DIAGNOSIS — Z72.51 HIGH RISK SEXUAL BEHAVIOR: ICD-10-CM

## 2018-06-04 DIAGNOSIS — Z91.89 AT RISK FOR SIDE EFFECT OF MEDICATION: Primary | ICD-10-CM

## 2018-06-04 RX ORDER — EMTRICITABINE AND TENOFOVIR DISOPROXIL FUMARATE 200; 300 MG/1; MG/1
1 TABLET, FILM COATED ORAL DAILY
Qty: 90 TABLET | Refills: 0 | OUTPATIENT
Start: 2018-06-04 | End: 2019-06-04

## 2018-06-05 DIAGNOSIS — Z91.89 AT RISK FOR HIV DUE TO HOMOSEXUAL CONTACT: ICD-10-CM

## 2018-06-05 DIAGNOSIS — Z72.51 HIGH RISK SEXUAL BEHAVIOR: ICD-10-CM

## 2018-06-05 RX ORDER — EMTRICITABINE AND TENOFOVIR DISOPROXIL FUMARATE 200; 300 MG/1; MG/1
1 TABLET, FILM COATED ORAL DAILY
Qty: 90 TABLET | Refills: 0 | Status: CANCELLED | OUTPATIENT
Start: 2018-06-05 | End: 2019-06-05

## 2018-06-07 ENCOUNTER — PATIENT MESSAGE (OUTPATIENT)
Dept: INTERNAL MEDICINE | Facility: CLINIC | Age: 23
End: 2018-06-07

## 2018-06-12 DIAGNOSIS — Z91.89 AT RISK FOR HIV DUE TO HOMOSEXUAL CONTACT: ICD-10-CM

## 2018-06-12 DIAGNOSIS — Z72.51 HIGH RISK SEXUAL BEHAVIOR: ICD-10-CM

## 2018-06-12 RX ORDER — EMTRICITABINE AND TENOFOVIR DISOPROXIL FUMARATE 200; 300 MG/1; MG/1
1 TABLET, FILM COATED ORAL DAILY
Qty: 90 TABLET | Refills: 0 | Status: SHIPPED | OUTPATIENT
Start: 2018-06-12 | End: 2018-10-08 | Stop reason: SDUPTHER

## 2018-06-13 ENCOUNTER — PATIENT MESSAGE (OUTPATIENT)
Dept: INTERNAL MEDICINE | Facility: CLINIC | Age: 23
End: 2018-06-13

## 2018-06-14 ENCOUNTER — TELEPHONE (OUTPATIENT)
Dept: PHARMACY | Facility: CLINIC | Age: 23
End: 2018-06-14

## 2018-06-14 NOTE — TELEPHONE ENCOUNTER
Patient having labs done tomorrow and request call back on Monday once labs have resulted for his PrEP.    Buster Wills, PharmD  Clinical Pharmacist  [facility]  [phone]

## 2018-06-14 NOTE — TELEPHONE ENCOUNTER
Patient called for refill readiness and follow up on Truvada.  No answer - lvm for call back.     Buster Wills, PharmD  Clinical Pharmacist  [facility]  [phone]

## 2018-06-15 ENCOUNTER — OFFICE VISIT (OUTPATIENT)
Dept: INTERNAL MEDICINE | Facility: CLINIC | Age: 23
End: 2018-06-15
Attending: INTERNAL MEDICINE
Payer: COMMERCIAL

## 2018-06-15 ENCOUNTER — LAB VISIT (OUTPATIENT)
Dept: LAB | Facility: OTHER | Age: 23
End: 2018-06-15
Attending: INTERNAL MEDICINE
Payer: COMMERCIAL

## 2018-06-15 DIAGNOSIS — Z91.89 AT RISK FOR SEXUALLY TRANSMITTED DISEASE DUE TO UNPROTECTED SEX: Primary | ICD-10-CM

## 2018-06-15 DIAGNOSIS — Z72.51 HIGH RISK SEXUAL BEHAVIOR: ICD-10-CM

## 2018-06-15 DIAGNOSIS — Z91.89 AT RISK FOR SIDE EFFECT OF MEDICATION: ICD-10-CM

## 2018-06-15 LAB
ALBUMIN SERPL BCP-MCNC: 4.6 G/DL
ALP SERPL-CCNC: 69 U/L
ALT SERPL W/O P-5'-P-CCNC: 17 U/L
ANION GAP SERPL CALC-SCNC: 8 MMOL/L
AST SERPL-CCNC: 17 U/L
BILIRUB SERPL-MCNC: 1.5 MG/DL
BUN SERPL-MCNC: 17 MG/DL
CALCIUM SERPL-MCNC: 9.9 MG/DL
CHLORIDE SERPL-SCNC: 103 MMOL/L
CO2 SERPL-SCNC: 29 MMOL/L
CREAT SERPL-MCNC: 1.1 MG/DL
EST. GFR  (AFRICAN AMERICAN): >60 ML/MIN/1.73 M^2
EST. GFR  (NON AFRICAN AMERICAN): >60 ML/MIN/1.73 M^2
GLUCOSE SERPL-MCNC: 77 MG/DL
POTASSIUM SERPL-SCNC: 4.2 MMOL/L
PROT SERPL-MCNC: 7.9 G/DL
SODIUM SERPL-SCNC: 140 MMOL/L

## 2018-06-15 PROCEDURE — 99499 UNLISTED E&M SERVICE: CPT | Mod: S$GLB,,, | Performed by: INTERNAL MEDICINE

## 2018-06-15 PROCEDURE — 36415 COLL VENOUS BLD VENIPUNCTURE: CPT

## 2018-06-15 PROCEDURE — 80053 COMPREHEN METABOLIC PANEL: CPT

## 2018-06-15 PROCEDURE — 86703 HIV-1/HIV-2 1 RESULT ANTBDY: CPT

## 2018-06-15 NOTE — PROGRESS NOTES
Pt checked in but pt did not need appt today. There was a miscommunication. No triage done. No LOS. No service provided

## 2018-06-18 ENCOUNTER — TELEPHONE (OUTPATIENT)
Dept: INTERNAL MEDICINE | Facility: CLINIC | Age: 23
End: 2018-06-18

## 2018-06-18 LAB — HIV 1+2 AB+HIV1 P24 AG SERPL QL IA: NEGATIVE

## 2018-06-18 NOTE — TELEPHONE ENCOUNTER
----- Message from Judy Penny sent at 6/18/2018 11:56 AM CDT -----  Contact: pt            Name of Who is Calling: JOSEPHINE HIGGINS [[id number]]      What is the request in detail:pt calling to get test results sent to [facility].. Please advise      Can the clinic reply by [facility]:yes      What Number to Call Back if not in [facility]: [phone]

## 2018-06-18 NOTE — TELEPHONE ENCOUNTER
Pt requesting that when his lab results are in to please send results through patient portal on [facility].

## 2018-06-19 ENCOUNTER — TELEPHONE (OUTPATIENT)
Dept: PHARMACY | Facility: CLINIC | Age: 23
End: 2018-06-19

## 2018-06-19 NOTE — TELEPHONE ENCOUNTER
Truvada refill confirmed.  Truvada will be shipped on  to arrive ..   $0 copay- 004. Confirmed 2 patient identifiers - name and .      Patient reports taking Truvada routinely and has about 2 doses on hand.  No side effects noted.  No missed doses, no new medications, no new allergies or health conditions reported at this time. All questions answered and addressed to patients satisfaction. Pt verbalized understanding.    Buster Wills, PharmD  Clinical Pharmacist  [facility]  [phone]

## 2018-07-06 ENCOUNTER — OFFICE VISIT (OUTPATIENT)
Dept: INTERNAL MEDICINE | Facility: CLINIC | Age: 23
End: 2018-07-06
Attending: INTERNAL MEDICINE
Payer: COMMERCIAL

## 2018-07-06 VITALS
HEART RATE: 76 BPM | DIASTOLIC BLOOD PRESSURE: 60 MMHG | HEIGHT: 74 IN | SYSTOLIC BLOOD PRESSURE: 120 MMHG | BODY MASS INDEX: 22.66 KG/M2 | WEIGHT: 176.56 LBS

## 2018-07-06 DIAGNOSIS — Z20.2 STD EXPOSURE: Primary | ICD-10-CM

## 2018-07-06 LAB
C TRACH DNA SPEC QL NAA+PROBE: NOT DETECTED
N GONORRHOEA DNA SPEC QL NAA+PROBE: NOT DETECTED

## 2018-07-06 PROCEDURE — 3008F BODY MASS INDEX DOCD: CPT | Mod: CPTII,S$GLB,, | Performed by: INTERNAL MEDICINE

## 2018-07-06 PROCEDURE — 87086 URINE CULTURE/COLONY COUNT: CPT

## 2018-07-06 PROCEDURE — 87491 CHLMYD TRACH DNA AMP PROBE: CPT

## 2018-07-06 PROCEDURE — 99999 PR PBB SHADOW E&M-EST. PATIENT-LVL III: CPT | Mod: PBBFAC,,, | Performed by: INTERNAL MEDICINE

## 2018-07-06 PROCEDURE — 87088 URINE BACTERIA CULTURE: CPT

## 2018-07-06 PROCEDURE — 99213 OFFICE O/P EST LOW 20 MIN: CPT | Mod: 25,S$GLB,, | Performed by: INTERNAL MEDICINE

## 2018-07-06 PROCEDURE — 96372 THER/PROPH/DIAG INJ SC/IM: CPT | Mod: S$GLB,,, | Performed by: INTERNAL MEDICINE

## 2018-07-06 RX ORDER — AZITHROMYCIN 250 MG/1
1000 TABLET, FILM COATED ORAL ONCE
Qty: 4 TABLET | Refills: 0 | Status: SHIPPED | OUTPATIENT
Start: 2018-07-06 | End: 2018-07-06

## 2018-07-06 RX ORDER — CEFTRIAXONE 250 MG/1
250 INJECTION, POWDER, FOR SOLUTION INTRAMUSCULAR; INTRAVENOUS
Status: COMPLETED | OUTPATIENT
Start: 2018-07-06 | End: 2018-07-06

## 2018-07-06 RX ADMIN — CEFTRIAXONE 250 MG: 250 INJECTION, POWDER, FOR SOLUTION INTRAMUSCULAR; INTRAVENOUS at 09:07

## 2018-07-06 NOTE — PROGRESS NOTES
Patient given Ceftriaxone 250mg IM in the LUOQ using 22g x 1.5 needle, site cleaned with alcohol prep.Patient tolerated well and Band-Aid was applied. Lot#[id number] Exp:6/1/2019.Ceftriaxone was reconstituted with 0.9 mL of 1% Lidocaine HCL as required.1% Lidocaine HCL  Lot#[id number] exp#8/20.    Patient advised to wait in the lobby for 15 min to make sure no adverse reactions occur. Patient states verbal understanding and has no further questions.

## 2018-07-06 NOTE — PROGRESS NOTES
"Subjective:       Patient ID: Marycarmen Jj is a 23 y.o. male.    Chief Complaint: Follow-up    Here for urgent visit    Boyfriend is showing signs of STD with penile discharge. Aside from some increased urinary frequency he denies  penile discharge, dysuria, urinary urgency, testicular swelling or pain, rash, or abd pain.        Review of Systems   Constitutional: Negative for activity change and unexpected weight change.   HENT: Negative for hearing loss, rhinorrhea and trouble swallowing.    Eyes: Negative for discharge and visual disturbance.   Respiratory: Negative for chest tightness and wheezing.    Cardiovascular: Negative for chest pain and palpitations.   Gastrointestinal: Negative for blood in stool, constipation, diarrhea and vomiting.   Endocrine: Negative for polydipsia and polyuria.   Genitourinary: Negative for difficulty urinating, hematuria and urgency.   Musculoskeletal: Negative for arthralgias, joint swelling and neck pain.   Neurological: Negative for weakness and headaches.   Psychiatric/Behavioral: Negative for confusion and dysphoric mood.       Objective:      Vitals:    07/06/18 0859   BP: 120/60   Pulse: 76   Weight: 80.1 kg (176 lb 9.4 oz)   Height: 6' 1.5" (1.867 m)      Physical Exam   Constitutional: He is oriented to person, place, and time. He appears well-developed and well-nourished. He does not have a sickly appearance. No distress.   HENT:   Head: Normocephalic and atraumatic.   Eyes: Conjunctivae and EOM are normal. Right eye exhibits no discharge. Left eye exhibits no discharge. No scleral icterus.   Pulmonary/Chest: Effort normal. No respiratory distress.   Abdominal: Normal appearance. He exhibits no distension.   Neurological: He is alert and oriented to person, place, and time.   Skin: Skin is warm and dry. He is not diaphoretic.   Psychiatric: He has a normal mood and affect. His speech is normal.       Assessment:       1. STD exposure        Plan:       Marycarmen " was seen today for follow-up.    Diagnoses and all orders for this visit:    STD exposure  -     C. trachomatis/N. gonorrhoeae by AMP DNA Urine  -     Urine culture  -     azithromycin (Z-NAYA) 250 MG tablet; Take 4 tablets (1,000 mg total) by mouth once. for 1 dose  -     cefTRIAXone injection 250 mg; Inject 250 mg into the muscle one time.   -no intercourse for 7 days and notify all partners             Side effects of medication(s) were discussed in detail and patient voiced understanding.  Patient will call back for any issues or complications.

## 2018-07-08 ENCOUNTER — PATIENT MESSAGE (OUTPATIENT)
Dept: INTERNAL MEDICINE | Facility: CLINIC | Age: 23
End: 2018-07-08

## 2018-07-08 LAB — BACTERIA UR CULT: NORMAL

## 2018-07-12 ENCOUNTER — TELEPHONE (OUTPATIENT)
Dept: PHARMACY | Facility: CLINIC | Age: 23
End: 2018-07-12

## 2018-08-24 ENCOUNTER — TELEPHONE (OUTPATIENT)
Dept: PHARMACY | Facility: CLINIC | Age: 23
End: 2018-08-24

## 2018-09-12 ENCOUNTER — TELEPHONE (OUTPATIENT)
Dept: PHARMACY | Facility: CLINIC | Age: 23
End: 2018-09-12

## 2018-09-12 NOTE — TELEPHONE ENCOUNTER
FOR DOCUMENTATION ONLY  Financial Assistance for Truvada approved from 9/12/18 to 9/12/19  Source: [employer]   BIN: [id number]  PCN: [id number]  ID:[id number]  GRP: [id number]    Amount Awarded: $7500

## 2018-09-13 ENCOUNTER — PATIENT MESSAGE (OUTPATIENT)
Dept: INTERNAL MEDICINE | Facility: CLINIC | Age: 23
End: 2018-09-13

## 2018-09-13 DIAGNOSIS — Z11.4 ENCOUNTER FOR SCREENING FOR HIV: Primary | ICD-10-CM

## 2018-09-18 ENCOUNTER — PATIENT MESSAGE (OUTPATIENT)
Dept: INTERNAL MEDICINE | Facility: CLINIC | Age: 23
End: 2018-09-18

## 2018-09-18 NOTE — TELEPHONE ENCOUNTER
Patient called to advise on the need for labs in order for Truvada as PrEP to be continued.  Patient instructed to call provider's office to arrange.  Patient acknowledged understanding.     Lanre Hamilton, DILCIA.Ph.  Clinical Pharmacist  [facility]  Phone: [phone]

## 2018-09-28 ENCOUNTER — PATIENT MESSAGE (OUTPATIENT)
Dept: INTERNAL MEDICINE | Facility: CLINIC | Age: 23
End: 2018-09-28

## 2018-10-03 ENCOUNTER — LAB VISIT (OUTPATIENT)
Dept: LAB | Facility: OTHER | Age: 23
End: 2018-10-03
Attending: INTERNAL MEDICINE
Payer: COMMERCIAL

## 2018-10-03 DIAGNOSIS — Z11.4 ENCOUNTER FOR SCREENING FOR HIV: ICD-10-CM

## 2018-10-03 LAB
ALBUMIN SERPL BCP-MCNC: 4.5 G/DL
ALP SERPL-CCNC: 78 U/L
ALT SERPL W/O P-5'-P-CCNC: 17 U/L
ANION GAP SERPL CALC-SCNC: 9 MMOL/L
AST SERPL-CCNC: 16 U/L
BILIRUB SERPL-MCNC: 1.2 MG/DL
BUN SERPL-MCNC: 11 MG/DL
CALCIUM SERPL-MCNC: 9.6 MG/DL
CHLORIDE SERPL-SCNC: 104 MMOL/L
CO2 SERPL-SCNC: 28 MMOL/L
CREAT SERPL-MCNC: 1 MG/DL
EST. GFR  (AFRICAN AMERICAN): >60 ML/MIN/1.73 M^2
EST. GFR  (NON AFRICAN AMERICAN): >60 ML/MIN/1.73 M^2
GLUCOSE SERPL-MCNC: 98 MG/DL
POTASSIUM SERPL-SCNC: 3.7 MMOL/L
PROT SERPL-MCNC: 7.9 G/DL
SODIUM SERPL-SCNC: 141 MMOL/L

## 2018-10-03 PROCEDURE — 80053 COMPREHEN METABOLIC PANEL: CPT

## 2018-10-03 PROCEDURE — 36415 COLL VENOUS BLD VENIPUNCTURE: CPT

## 2018-10-03 PROCEDURE — 86703 HIV-1/HIV-2 1 RESULT ANTBDY: CPT

## 2018-10-04 LAB — HIV 1+2 AB+HIV1 P24 AG SERPL QL IA: NEGATIVE

## 2018-10-05 ENCOUNTER — PATIENT MESSAGE (OUTPATIENT)
Dept: ADMINISTRATIVE | Facility: OTHER | Age: 23
End: 2018-10-05

## 2018-10-05 DIAGNOSIS — Z91.89 AT RISK FOR HIV DUE TO HOMOSEXUAL CONTACT: ICD-10-CM

## 2018-10-05 DIAGNOSIS — Z72.51 HIGH RISK SEXUAL BEHAVIOR: ICD-10-CM

## 2018-10-05 RX ORDER — EMTRICITABINE AND TENOFOVIR DISOPROXIL FUMARATE 200; 300 MG/1; MG/1
1 TABLET, FILM COATED ORAL DAILY
Qty: 90 TABLET | Refills: 0 | OUTPATIENT
Start: 2018-10-05 | End: 2019-10-05

## 2018-10-08 ENCOUNTER — PATIENT MESSAGE (OUTPATIENT)
Dept: PRIMARY CARE CLINIC | Facility: CLINIC | Age: 23
End: 2018-10-08

## 2018-10-08 DIAGNOSIS — Z91.89 AT RISK FOR HIV DUE TO HOMOSEXUAL CONTACT: ICD-10-CM

## 2018-10-08 DIAGNOSIS — Z72.51 HIGH RISK SEXUAL BEHAVIOR: ICD-10-CM

## 2018-10-08 RX ORDER — EMTRICITABINE AND TENOFOVIR DISOPROXIL FUMARATE 200; 300 MG/1; MG/1
1 TABLET, FILM COATED ORAL DAILY
Qty: 90 TABLET | Refills: 0 | Status: SHIPPED | OUTPATIENT
Start: 2018-10-08 | End: 2019-03-26 | Stop reason: SDUPTHER

## 2018-10-09 ENCOUNTER — PATIENT MESSAGE (OUTPATIENT)
Dept: ADMINISTRATIVE | Facility: OTHER | Age: 23
End: 2018-10-09

## 2018-10-09 RX ORDER — EMTRICITABINE AND TENOFOVIR DISOPROXIL FUMARATE 200; 300 MG/1; MG/1
1 TABLET, FILM COATED ORAL DAILY
Qty: 90 TABLET | Refills: 0 | OUTPATIENT
Start: 2018-10-09 | End: 2019-10-09

## 2018-10-09 NOTE — TELEPHONE ENCOUNTER
----- Message from Buster Wills PharmD sent at 10/8/2018  4:42 PM CDT -----  Regarding: Truvada refill  Contact: [phone]  Patient had labs completed on 10/3, but his refill request was denied for Truvada.  Does patient need some other bloodwork done?  Thanks!    Buster Wills, PharmD  Clinical Pharmacist  [facility]  [phone]

## 2018-10-11 NOTE — TELEPHONE ENCOUNTER
Truvada refill confirmed.  Truvada to be shipped on 10/15 to arrive on 10/16.  $0 copay- 004. Confirmed 2 patient identifiers - name and .      Patient reports taking Truvada routinely and has about 7 doses on hand.  No side effects noted.  No missed doses, no new medications, no new allergies or health conditions reported at this time. All questions answered and addressed to patients satisfaction. Pt verbalized understanding.

## 2018-10-11 NOTE — TELEPHONE ENCOUNTER
Patient called for refill readiness and follow up on Truvada.  No answer - lvm for call back.     Lanre Hamilton, DILCIA.Ph.  Clinical Pharmacist  [facility]  Phone: [phone]

## 2018-11-06 ENCOUNTER — TELEPHONE (OUTPATIENT)
Dept: PHARMACY | Facility: CLINIC | Age: 23
End: 2018-11-06

## 2018-11-06 NOTE — TELEPHONE ENCOUNTER
Refill call for Truvada. Patient confirmed that they are in need of the refill. Will prepare to ship out 18 to arrive 18 with patient consent Copay $0 at 004. Address &  confirmed.Patient has 5 doses remaining. Patient has not started any new medications, no missed doses and no side effects. Patient taking the medication as directed by doctor. Patient does not have any questions or concerns at this time.

## 2018-12-24 ENCOUNTER — PATIENT MESSAGE (OUTPATIENT)
Dept: INTERNAL MEDICINE | Facility: CLINIC | Age: 23
End: 2018-12-24

## 2018-12-24 DIAGNOSIS — Z91.89 AT RISK FOR HIV DUE TO HOMOSEXUAL CONTACT: ICD-10-CM

## 2018-12-24 DIAGNOSIS — Z72.51 HIGH RISK SEXUAL BEHAVIOR: ICD-10-CM

## 2018-12-24 RX ORDER — EMTRICITABINE AND TENOFOVIR DISOPROXIL FUMARATE 200; 300 MG/1; MG/1
1 TABLET, FILM COATED ORAL DAILY
Qty: 90 TABLET | Refills: 0 | Status: CANCELLED | OUTPATIENT
Start: 2018-12-24 | End: 2019-12-24

## 2018-12-26 ENCOUNTER — TELEPHONE (OUTPATIENT)
Dept: INTERNAL MEDICINE | Facility: CLINIC | Age: 23
End: 2018-12-26

## 2018-12-26 DIAGNOSIS — Z91.89 AT RISK FOR HIV DUE TO HOMOSEXUAL CONTACT: ICD-10-CM

## 2018-12-26 DIAGNOSIS — Z72.51 HIGH RISK SEXUAL BEHAVIOR: ICD-10-CM

## 2018-12-26 RX ORDER — EMTRICITABINE AND TENOFOVIR DISOPROXIL FUMARATE 200; 300 MG/1; MG/1
1 TABLET, FILM COATED ORAL DAILY
Qty: 90 TABLET | Refills: 0 | OUTPATIENT
Start: 2018-12-26 | End: 2019-12-26

## 2019-01-02 ENCOUNTER — PATIENT MESSAGE (OUTPATIENT)
Dept: INTERNAL MEDICINE | Facility: CLINIC | Age: 24
End: 2019-01-02

## 2019-01-11 NOTE — TELEPHONE ENCOUNTER
Advise provider that we have reached patient.     Lanre Hamilton R.Ph., AAHIVP  Clinical Pharmacist, HIV/HCV  [facility]  Phone: [phone]

## 2019-01-16 ENCOUNTER — PATIENT MESSAGE (OUTPATIENT)
Dept: ADMINISTRATIVE | Facility: OTHER | Age: 24
End: 2019-01-16

## 2019-03-22 ENCOUNTER — PATIENT OUTREACH (OUTPATIENT)
Dept: ADMINISTRATIVE | Facility: HOSPITAL | Age: 24
End: 2019-03-22

## 2019-03-25 ENCOUNTER — OFFICE VISIT (OUTPATIENT)
Dept: INTERNAL MEDICINE | Facility: CLINIC | Age: 24
End: 2019-03-25
Attending: INTERNAL MEDICINE
Payer: COMMERCIAL

## 2019-03-25 ENCOUNTER — LAB VISIT (OUTPATIENT)
Dept: LAB | Facility: OTHER | Age: 24
End: 2019-03-25
Attending: INTERNAL MEDICINE
Payer: COMMERCIAL

## 2019-03-25 VITALS
HEIGHT: 74 IN | HEART RATE: 78 BPM | BODY MASS INDEX: 23.54 KG/M2 | WEIGHT: 183.44 LBS | DIASTOLIC BLOOD PRESSURE: 70 MMHG | SYSTOLIC BLOOD PRESSURE: 118 MMHG

## 2019-03-25 DIAGNOSIS — Z00.00 ANNUAL PHYSICAL EXAM: ICD-10-CM

## 2019-03-25 DIAGNOSIS — A54.9 GONORRHEA IN MALE: ICD-10-CM

## 2019-03-25 DIAGNOSIS — Z91.89 AT RISK FOR HIV DUE TO HOMOSEXUAL CONTACT: ICD-10-CM

## 2019-03-25 DIAGNOSIS — Z00.00 ANNUAL PHYSICAL EXAM: Primary | ICD-10-CM

## 2019-03-25 DIAGNOSIS — A63.0 ANAL CONDYLOMA: ICD-10-CM

## 2019-03-25 LAB
ALBUMIN SERPL BCP-MCNC: 4.6 G/DL (ref 3.5–5.2)
ALP SERPL-CCNC: 80 U/L (ref 55–135)
ALT SERPL W/O P-5'-P-CCNC: 25 U/L (ref 10–44)
ANION GAP SERPL CALC-SCNC: 9 MMOL/L (ref 8–16)
AST SERPL-CCNC: 20 U/L (ref 10–40)
BASOPHILS # BLD AUTO: 0.02 K/UL (ref 0–0.2)
BASOPHILS NFR BLD: 0.4 % (ref 0–1.9)
BILIRUB SERPL-MCNC: 1 MG/DL (ref 0.1–1)
BUN SERPL-MCNC: 12 MG/DL (ref 6–20)
CALCIUM SERPL-MCNC: 9.5 MG/DL (ref 8.7–10.5)
CHLORIDE SERPL-SCNC: 106 MMOL/L (ref 95–110)
CHOLEST SERPL-MCNC: 142 MG/DL (ref 120–199)
CHOLEST/HDLC SERPL: 3.3 {RATIO} (ref 2–5)
CO2 SERPL-SCNC: 25 MMOL/L (ref 23–29)
CREAT SERPL-MCNC: 0.9 MG/DL (ref 0.5–1.4)
DIFFERENTIAL METHOD: ABNORMAL
EOSINOPHIL # BLD AUTO: 0 K/UL (ref 0–0.5)
EOSINOPHIL NFR BLD: 0.7 % (ref 0–8)
ERYTHROCYTE [DISTWIDTH] IN BLOOD BY AUTOMATED COUNT: 13.3 % (ref 11.5–14.5)
EST. GFR  (AFRICAN AMERICAN): >60 ML/MIN/1.73 M^2
EST. GFR  (NON AFRICAN AMERICAN): >60 ML/MIN/1.73 M^2
ESTIMATED AVG GLUCOSE: 91 MG/DL (ref 68–131)
GLUCOSE SERPL-MCNC: 80 MG/DL (ref 70–110)
HBA1C MFR BLD HPLC: 4.8 % (ref 4–5.6)
HCT VFR BLD AUTO: 47 % (ref 40–54)
HDLC SERPL-MCNC: 43 MG/DL (ref 40–75)
HDLC SERPL: 30.3 % (ref 20–50)
HGB BLD-MCNC: 16.1 G/DL (ref 14–18)
LDLC SERPL CALC-MCNC: 86.2 MG/DL (ref 63–159)
LYMPHOCYTES # BLD AUTO: 1.7 K/UL (ref 1–4.8)
LYMPHOCYTES NFR BLD: 30.6 % (ref 18–48)
MCH RBC QN AUTO: 30.1 PG (ref 27–31)
MCHC RBC AUTO-ENTMCNC: 34.3 G/DL (ref 32–36)
MCV RBC AUTO: 88 FL (ref 82–98)
MONOCYTES # BLD AUTO: 0.5 K/UL (ref 0.3–1)
MONOCYTES NFR BLD: 9 % (ref 4–15)
NEUTROPHILS # BLD AUTO: 3.3 K/UL (ref 1.8–7.7)
NEUTROPHILS NFR BLD: 58.9 % (ref 38–73)
NONHDLC SERPL-MCNC: 99 MG/DL
PLATELET # BLD AUTO: 404 K/UL (ref 150–350)
PMV BLD AUTO: 10.6 FL (ref 9.2–12.9)
POTASSIUM SERPL-SCNC: 3.9 MMOL/L (ref 3.5–5.1)
PROT SERPL-MCNC: 7.8 G/DL (ref 6–8.4)
RBC # BLD AUTO: 5.34 M/UL (ref 4.6–6.2)
SODIUM SERPL-SCNC: 140 MMOL/L (ref 136–145)
TRIGL SERPL-MCNC: 64 MG/DL (ref 30–150)
TSH SERPL DL<=0.005 MIU/L-ACNC: 1.22 UIU/ML (ref 0.4–4)
WBC # BLD AUTO: 5.65 K/UL (ref 3.9–12.7)

## 2019-03-25 PROCEDURE — 84443 ASSAY THYROID STIM HORMONE: CPT

## 2019-03-25 PROCEDURE — 96372 PR INJECTION,THERAP/PROPH/DIAG2ST, IM OR SUBCUT: ICD-10-PCS | Mod: S$GLB,,, | Performed by: INTERNAL MEDICINE

## 2019-03-25 PROCEDURE — 36415 COLL VENOUS BLD VENIPUNCTURE: CPT

## 2019-03-25 PROCEDURE — 80061 LIPID PANEL: CPT

## 2019-03-25 PROCEDURE — 83036 HEMOGLOBIN GLYCOSYLATED A1C: CPT

## 2019-03-25 PROCEDURE — 99395 PREV VISIT EST AGE 18-39: CPT | Mod: 25,S$GLB,, | Performed by: INTERNAL MEDICINE

## 2019-03-25 PROCEDURE — 99395 PR PREVENTIVE VISIT,EST,18-39: ICD-10-PCS | Mod: 25,S$GLB,, | Performed by: INTERNAL MEDICINE

## 2019-03-25 PROCEDURE — 99999 PR PBB SHADOW E&M-EST. PATIENT-LVL III: ICD-10-PCS | Mod: PBBFAC,,, | Performed by: INTERNAL MEDICINE

## 2019-03-25 PROCEDURE — 85025 COMPLETE CBC W/AUTO DIFF WBC: CPT

## 2019-03-25 PROCEDURE — 80053 COMPREHEN METABOLIC PANEL: CPT

## 2019-03-25 PROCEDURE — 86703 HIV-1/HIV-2 1 RESULT ANTBDY: CPT

## 2019-03-25 PROCEDURE — 96372 THER/PROPH/DIAG INJ SC/IM: CPT | Mod: S$GLB,,, | Performed by: INTERNAL MEDICINE

## 2019-03-25 PROCEDURE — 99999 PR PBB SHADOW E&M-EST. PATIENT-LVL III: CPT | Mod: PBBFAC,,, | Performed by: INTERNAL MEDICINE

## 2019-03-25 RX ORDER — CEFTRIAXONE 250 MG/1
250 INJECTION, POWDER, FOR SOLUTION INTRAMUSCULAR; INTRAVENOUS
Status: COMPLETED | OUTPATIENT
Start: 2019-03-25 | End: 2019-03-25

## 2019-03-25 RX ORDER — AZITHROMYCIN 250 MG/1
1000 TABLET, FILM COATED ORAL ONCE
Qty: 4 TABLET | Refills: 0 | Status: SHIPPED | OUTPATIENT
Start: 2019-03-25 | End: 2019-03-25

## 2019-03-25 RX ADMIN — CEFTRIAXONE 250 MG: 250 INJECTION, POWDER, FOR SOLUTION INTRAMUSCULAR; INTRAVENOUS at 02:03

## 2019-03-25 NOTE — PROGRESS NOTES
"Subjective:       Patient ID: Marycarmen Jj is a 23 y.o. male.    Chief Complaint: Exposure to STD ( STI)    Here for annual exam    1 month ago had exposure and recently notified about positive gonorrhea testing. Asymptomatic. He denies penile discharge, dysuria, urinary frequency or urgency, testicular swelling or pain, rash, or abd pain.        Review of Systems   Constitutional: Negative for appetite change, chills, fever and unexpected weight change.   HENT: Negative for hearing loss, sore throat and trouble swallowing.    Eyes: Negative for visual disturbance.   Respiratory: Negative for cough, chest tightness and shortness of breath.    Cardiovascular: Negative for chest pain and leg swelling.   Gastrointestinal: Negative for abdominal pain, blood in stool, constipation, diarrhea, nausea and vomiting.   Endocrine: Negative for polydipsia and polyuria.   Genitourinary: Negative for decreased urine volume, difficulty urinating, dysuria, frequency and urgency.   Musculoskeletal: Negative for gait problem.   Skin: Negative for rash.   Neurological: Negative for dizziness and numbness.   Psychiatric/Behavioral: The patient is not nervous/anxious.        Objective:      Vitals:    03/25/19 1327   BP: 118/70   Pulse: 78   Weight: 83.2 kg (183 lb 6.8 oz)   Height: 6' 1.5" (1.867 m)      Physical Exam   Constitutional: He is oriented to person, place, and time. He appears well-developed and well-nourished. No distress.   HENT:   Head: Normocephalic and atraumatic.   Mouth/Throat: Oropharynx is clear and moist. No oropharyngeal exudate.   Eyes: Pupils are equal, round, and reactive to light. Conjunctivae and EOM are normal. No scleral icterus.   Neck: No thyromegaly present.   Cardiovascular: Normal rate, regular rhythm and normal heart sounds.   No murmur heard.  Pulmonary/Chest: Effort normal and breath sounds normal. He has no wheezes. He has no rales.   Abdominal: Soft. He exhibits no distension. There is no " tenderness.   Musculoskeletal: He exhibits no edema or tenderness.   Lymphadenopathy:     He has no cervical adenopathy.   Neurological: He is alert and oriented to person, place, and time.   Skin: Skin is warm and dry.   Psychiatric: He has a normal mood and affect. His behavior is normal.       Assessment:       1. Annual physical exam    2. At risk for HIV due to homosexual contact    3. Anal condyloma    4. Gonorrhea in male        Plan:       Marycarmen was seen today for exposure to std.    Diagnoses and all orders for this visit:    Annual physical exam  -     Comprehensive metabolic panel; Future  -     Lipid panel; Future  -     TSH; Future  -     CBC auto differential; Future  -     Hemoglobin A1c; Future    At risk for HIV due to homosexual contact  -     HIV 1/2 Ag/Ab (4th Gen); Future    Anal condyloma  -     Ambulatory Referral to Colorectal Surgery    Gonorrhea in male  -     cefTRIAXone injection 250 mg  -     azithromycin (Z-NAYA) 250 MG tablet; Take 4 tablets (1,000 mg total) by mouth once. for 1 dose         RTC in 6 months or sooner prn           Side effects of medication(s) were discussed in detail and patient voiced understanding.  Patient will call back for any issues or complications.

## 2019-03-25 NOTE — PROGRESS NOTES
"Per order, patient to receive Ceftriaxone 250mg IM. Prior to administration, the patient's allergies were reviewed. The area of injection was identified in the upper outer quadrant of the left buttock. This area was cleaned with alcohol. Using a 25g 1.5" safety needle, 1mL of a solution containing ceftriaxone 250 mg reconstituted with 2.1mL of lidocaine 1% (mixed prior to administration) was placed into the muscle. The injection site was dressed with a bandage. Patient experienced no complications and was discharged in stable condition. Patient was notified to apply ice if they experienced any discomfort at the injection site. Ceftriaxone 250mg Lot: [id number] Exp: 02/2021, Lidocaine 1% Lot: [id number], Exp:08/20  "

## 2019-03-26 ENCOUNTER — PATIENT MESSAGE (OUTPATIENT)
Dept: INTERNAL MEDICINE | Facility: CLINIC | Age: 24
End: 2019-03-26

## 2019-03-26 DIAGNOSIS — Z91.89 AT RISK FOR HIV DUE TO HOMOSEXUAL CONTACT: ICD-10-CM

## 2019-03-26 DIAGNOSIS — Z72.51 HIGH RISK SEXUAL BEHAVIOR: ICD-10-CM

## 2019-03-26 LAB — HIV 1+2 AB+HIV1 P24 AG SERPL QL IA: NEGATIVE

## 2019-03-26 RX ORDER — EMTRICITABINE AND TENOFOVIR DISOPROXIL FUMARATE 200; 300 MG/1; MG/1
1 TABLET, FILM COATED ORAL DAILY
Qty: 90 TABLET | Refills: 0 | Status: SHIPPED | OUTPATIENT
Start: 2019-03-26 | End: 2019-06-20 | Stop reason: SDUPTHER

## 2019-03-28 ENCOUNTER — TELEPHONE (OUTPATIENT)
Dept: PHARMACY | Facility: CLINIC | Age: 24
End: 2019-03-28

## 2019-03-29 NOTE — TELEPHONE ENCOUNTER
Patient called for consult and ship of Truvada as patient re-starts Truvada as PrEP.  Name and  confirmed.  This is being re-started after a break of a few months due to cost issues among other barriers to continuation of tx.     Patient denies having side effects, complaints, or problems with Truvada while on it previously and likewise denies any issues with non-adherence beyond those described above.  Patient's chief issues with continuity of care in the past have been related to cost as patient has a high rx copay plan.  This has been addressed with copay assistance and the FA team at [facility] will continue to monitor this situation for changes.     Patient is aware that upon re-starting Truvada as PrEP is not instantly effective but requires approximately 7 days of daily dosing before any efficacy is established with peak efficacy being achieved somewhere between day 14-21 of daily dosing.     Refill readiness for Truvada confirmed with patient; name/ confirmed; no missed doses; no new medications; no side effects noted; address confirmed for  shipment and  delivery.  Patient states they have 0 doses remaining.     Lanre Hamilton, DILCIA.Ph., AAHIVP  Clinical Pharmacist, HIV/HCV  [facility]  Phone: [phone]

## 2019-03-29 NOTE — TELEPHONE ENCOUNTER
Documentation Only:     Prior Authorization for Truvada IS NOT required     Patient co-pay: $726.39     Patient Assistance IS required.     Patient has been enrolled in Truvada copay card program which will reduce patient copay to $0. Copay card info is listed below.     Truvada Copay Card  Bin: [id number]  PCN: [employer]  Grp: [id number]  ID: [id number]  Help Desk: [phone]        Forwarding to clinical pharmacist for consult and shipment.    DICK 10:51am

## 2019-04-09 ENCOUNTER — PATIENT MESSAGE (OUTPATIENT)
Dept: ADMINISTRATIVE | Facility: OTHER | Age: 24
End: 2019-04-09

## 2019-04-15 ENCOUNTER — OFFICE VISIT (OUTPATIENT)
Dept: SURGERY | Facility: CLINIC | Age: 24
End: 2019-04-15
Payer: COMMERCIAL

## 2019-04-15 VITALS
DIASTOLIC BLOOD PRESSURE: 77 MMHG | BODY MASS INDEX: 23.62 KG/M2 | HEIGHT: 74 IN | HEART RATE: 80 BPM | SYSTOLIC BLOOD PRESSURE: 150 MMHG | WEIGHT: 184.06 LBS

## 2019-04-15 DIAGNOSIS — Z87.19 HISTORY OF ANAL DYSPLASIA: Primary | ICD-10-CM

## 2019-04-15 PROCEDURE — 3008F BODY MASS INDEX DOCD: CPT | Mod: CPTII,S$GLB,, | Performed by: COLON & RECTAL SURGERY

## 2019-04-15 PROCEDURE — 99212 OFFICE O/P EST SF 10 MIN: CPT | Mod: 25,S$GLB,, | Performed by: COLON & RECTAL SURGERY

## 2019-04-15 PROCEDURE — 99999 PR PBB SHADOW E&M-EST. PATIENT-LVL III: ICD-10-PCS | Mod: PBBFAC,,, | Performed by: COLON & RECTAL SURGERY

## 2019-04-15 PROCEDURE — 46600 PR DIAG2STIC A2SCOPY: ICD-10-PCS | Mod: S$GLB,,, | Performed by: COLON & RECTAL SURGERY

## 2019-04-15 PROCEDURE — 99999 PR PBB SHADOW E&M-EST. PATIENT-LVL III: CPT | Mod: PBBFAC,,, | Performed by: COLON & RECTAL SURGERY

## 2019-04-15 PROCEDURE — 99212 PR OFFICE/OUTPT VISIT, EST, LEVL II, 10-19 MIN: ICD-10-PCS | Mod: 25,S$GLB,, | Performed by: COLON & RECTAL SURGERY

## 2019-04-15 PROCEDURE — 3008F PR BODY MASS INDEX (BMI) DOCUMENTED: ICD-10-PCS | Mod: CPTII,S$GLB,, | Performed by: COLON & RECTAL SURGERY

## 2019-04-15 PROCEDURE — 46600 DIAGNOSTIC ANOSCOPY SPX: CPT | Mod: S$GLB,,, | Performed by: COLON & RECTAL SURGERY

## 2019-04-15 RX ORDER — AZITHROMYCIN 250 MG/1
TABLET, FILM COATED ORAL
Refills: 0 | COMMUNITY
Start: 2019-03-25 | End: 2020-11-03

## 2019-04-15 NOTE — LETTER
April 20, 2019      Hitesh Dumont MD  [address]  [address]  [facility] [address]           [facility]  [address]  [facility] [address]  Phone: [phone]          Patient: Marycarmen Jj   MR Number: [id number]   YOB: 1995   Date of Visit: 4/15/2019       Dear Dr. Hitesh Dumont:    Thank you for referring Marycarmen Jj to me for evaluation. Attached you will find relevant portions of my assessment and plan of care.    If you have questions, please do not hesitate to call me. I look forward to following Marycarmen Jj along with you.    Sincerely,    Mark Bella MD    Enclosure  CC:  No Recipients    If you would like to receive this communication electronically, please contact [email or web address] or [phone] to request more information on [facility] access.    For providers and/or their staff who would like to refer a patient to [facility], please contact us through our one-stop-shop provider referral line, [facility], at [phone].    If you feel you have received this communication in error or would no longer like to receive these types of communications, please e-mail [email or web address]

## 2019-04-20 NOTE — PROGRESS NOTES
Subjective:       Patient ID: Marycarmen Jj is a 23 y.o. male.    Chief Complaint: ANAL CONDYLOMA    HPI 22 yo M referred following abnormal anal PAP smear.  Anal PAP done by PCP 5/10/17 - Low-grade squamous intraepithelial lesion (LSIL).  +MSM, +anal receptive intercourse, in a monagamous relationship.  HIV negative, no high risk behavior.  No significant pain with BM's or intercourse but does have occasional bleeding after BM's.  Taken to OR 6/16/17 for fulgaration/excision of anal condyloma.  Found to have near-circumferential internal lesions at level of anal transition zone.  Path showed LGD.    He returned 9/2017 for surveillance exam. No new complaints.  No anal pain or bleeding.  Exam normal.  He was to follow up in 3-6 months.    He returns today (19 months later) for follow-up.  No new complaints.  No anal pain or bleeding.    Last colonoscopy - Never  No family hx of CRC or IBD.    Review of patient's allergies indicates:  No Known Allergies    History reviewed. No pertinent past medical history.    Past Surgical History:   Procedure Laterality Date    EXCISION-CONDYLOMA N/A 6/16/2017    Performed by Mark Bella MD at [facility] OR [address] [place] [address]       Current Outpatient Medications   Medication Sig Dispense Refill    emtricitabine-tenofovir 200-300 mg (TRUVADA) 200-300 mg Tab Take 1 tablet by mouth once daily. 90 tablet 0    azithromycin (Z-NAYA) 250 MG tablet TAKE 4 TABLETS BY MOUTH ONCE. FOR 1 DOSE  0     No current facility-administered medications for this visit.        Family History   Problem Relation Age of Onset    No Known Problems Mother     Arthritis Father     Hypertension Maternal Uncle        Social History     Socioeconomic History    Marital status: Single     Spouse name: Not on file    Number of children: Not on file    Years of education: Not on file    Highest education level: Not on file   Occupational History    Not on file   Social Needs    Financial resource strain: Not on  file    Food insecurity:     Worry: Not on file     Inability: Not on file    Transportation needs:     Medical: Not on file     Non-medical: Not on file   Tobacco Use    Smoking status: Never Smoker    Smokeless tobacco: Never Used   Substance and Sexual Activity    Alcohol use: Not on file    Drug use: Not on file    Sexual activity: Not on file   Lifestyle    Physical activity:     Days per week: Not on file     Minutes per session: Not on file    Stress: Not on file   Relationships    Social connections:     Talks on phone: Not on file     Gets together: Not on file     Attends [employer] service: Not on file     Active member of club or organization: Not on file     Attends meetings of clubs or organizations: Not on file     Relationship status: Not on file   Other Topics Concern    Not on file   Social History Narrative    Not on file       Review of Systems   Constitutional: Negative for chills and fever.   HENT: Negative for congestion and sinus pressure.    Eyes: Negative for visual disturbance.   Respiratory: Negative for cough and shortness of breath.    Cardiovascular: Negative for chest pain and palpitations.   Gastrointestinal: Negative for abdominal distention, abdominal pain, anal bleeding, blood in stool, constipation, diarrhea, nausea, rectal pain and vomiting.   Endocrine: Negative for cold intolerance and heat intolerance.   Genitourinary: Negative for dysuria and frequency.   Musculoskeletal: Negative for arthralgias and back pain.   Skin: Negative for rash.   Allergic/Immunologic: Negative for immunocompromised state.   Neurological: Negative for dizziness, light-headedness and headaches.   Psychiatric/Behavioral: Negative for confusion. The patient is not nervous/anxious.        Objective:      Physical Exam   Constitutional: He is oriented to person, place, and time. He appears well-developed and well-nourished.   HENT:   Head: Normocephalic and atraumatic.   Eyes: Conjunctivae are  normal.   Pulmonary/Chest: Effort normal. No respiratory distress.   Abdominal: Soft. Bowel sounds are normal. He exhibits no distension and no mass. There is no tenderness. There is no rebound and no guarding.   Genitourinary:   Genitourinary Comments: Perineum - normal perianal skin, no mass, no fissure, no external hemorrhoids, no external condyloma  DEVEN - good tone, no mass  Anoscopy - no internal condyloma, no significant internal hemorrhoids     Neurological: He is alert and oriented to person, place, and time.   Skin: Skin is warm and dry. No erythema.         Lab Results   Component Value Date    WBC 5.65 03/25/2019    HGB 16.1 03/25/2019    HCT 47.0 03/25/2019    MCV 88 03/25/2019     (H) 03/25/2019     BMP  Lab Results   Component Value Date     03/25/2019    K 3.9 03/25/2019     03/25/2019    CO2 25 03/25/2019    BUN 12 03/25/2019    CREATININE 0.9 03/25/2019    CALCIUM 9.5 03/25/2019    ANIONGAP 9 03/25/2019    ESTGFRAFRICA >60 03/25/2019    EGFRNONAA >60 03/25/2019     CMP  Sodium   Date Value Ref Range Status   03/25/2019 140 136 - 145 mmol/L Final     Potassium   Date Value Ref Range Status   03/25/2019 3.9 3.5 - 5.1 mmol/L Final     Chloride   Date Value Ref Range Status   03/25/2019 106 95 - 110 mmol/L Final     CO2   Date Value Ref Range Status   03/25/2019 25 23 - 29 mmol/L Final     Glucose   Date Value Ref Range Status   03/25/2019 80 70 - 110 mg/dL Final     BUN, Bld   Date Value Ref Range Status   03/25/2019 12 6 - 20 mg/dL Final     Creatinine   Date Value Ref Range Status   03/25/2019 0.9 0.5 - 1.4 mg/dL Final     Calcium   Date Value Ref Range Status   03/25/2019 9.5 8.7 - 10.5 mg/dL Final     Total Protein   Date Value Ref Range Status   03/25/2019 7.8 6.0 - 8.4 g/dL Final     Albumin   Date Value Ref Range Status   03/25/2019 4.6 3.5 - 5.2 g/dL Final     Total Bilirubin   Date Value Ref Range Status   03/25/2019 1.0 0.1 - 1.0 mg/dL Final     Comment:     For infants and  newborns, interpretation of results should be based  on gestational age, weight and in agreement with clinical  observations.  Premature Infant recommended reference ranges:  Up to 24 hours.............<8.0 mg/dL  Up to 48 hours............<12.0 mg/dL  3-5 days..................<15.0 mg/dL  6-29 days.................<15.0 mg/dL       Alkaline Phosphatase   Date Value Ref Range Status   03/25/2019 80 55 - 135 U/L Final     AST   Date Value Ref Range Status   03/25/2019 20 10 - 40 U/L Final     ALT   Date Value Ref Range Status   03/25/2019 25 10 - 44 U/L Final     Anion Gap   Date Value Ref Range Status   03/25/2019 9 8 - 16 mmol/L Final     eGFR if    Date Value Ref Range Status   03/25/2019 >60 >60 mL/min/1.73 m^2 Final     eGFR if non    Date Value Ref Range Status   03/25/2019 >60 >60 mL/min/1.73 m^2 Final     Comment:     Calculation used to obtain the estimated glomerular filtration  rate (eGFR) is the CKD-EPI equation.        No results found for: CEA    Assessment:       1. History of anal dysplasia      Normal surveillance anoscopy today    Plan:   RTO for surveillance anoscopy 12 months    Mark Bella MD, FACS, FASCRS  Staff Surgeon  Department of Colon & Rectal Surgery

## 2019-04-23 ENCOUNTER — TELEPHONE (OUTPATIENT)
Dept: PHARMACY | Facility: CLINIC | Age: 24
End: 2019-04-23

## 2019-05-16 ENCOUNTER — TELEPHONE (OUTPATIENT)
Dept: PHARMACY | Facility: CLINIC | Age: 24
End: 2019-05-16

## 2019-06-13 ENCOUNTER — TELEPHONE (OUTPATIENT)
Dept: INTERNAL MEDICINE | Facility: CLINIC | Age: 24
End: 2019-06-13

## 2019-06-13 ENCOUNTER — TELEPHONE (OUTPATIENT)
Dept: PHARMACY | Facility: CLINIC | Age: 24
End: 2019-06-13

## 2019-06-13 DIAGNOSIS — Z11.3 SCREENING FOR STD (SEXUALLY TRANSMITTED DISEASE): Primary | ICD-10-CM

## 2019-06-13 DIAGNOSIS — Z72.51 HIGH RISK SEXUAL BEHAVIOR, UNSPECIFIED TYPE: Primary | ICD-10-CM

## 2019-06-13 DIAGNOSIS — Z91.89 AT RISK FOR HIV DUE TO HOMOSEXUAL CONTACT: ICD-10-CM

## 2019-06-13 DIAGNOSIS — Z72.51 HIGH RISK SEXUAL BEHAVIOR: ICD-10-CM

## 2019-06-13 RX ORDER — EMTRICITABINE AND TENOFOVIR DISOPROXIL FUMARATE 200; 300 MG/1; MG/1
1 TABLET, FILM COATED ORAL DAILY
Qty: 90 TABLET | Refills: 0 | Status: CANCELLED | OUTPATIENT
Start: 2019-06-13 | End: 2020-06-12

## 2019-06-14 NOTE — TELEPHONE ENCOUNTER
Sent msg on mychart to Pt asking what day he would like his labs scheduled and a reminder to  home collect labs

## 2019-06-17 ENCOUNTER — LAB VISIT (OUTPATIENT)
Dept: LAB | Facility: OTHER | Age: 24
End: 2019-06-17
Attending: INTERNAL MEDICINE
Payer: COMMERCIAL

## 2019-06-17 DIAGNOSIS — Z72.51 HIGH RISK SEXUAL BEHAVIOR, UNSPECIFIED TYPE: ICD-10-CM

## 2019-06-17 DIAGNOSIS — Z11.4 ENCOUNTER FOR SCREENING FOR HIV: ICD-10-CM

## 2019-06-17 LAB
ALBUMIN SERPL BCP-MCNC: 4.7 G/DL (ref 3.5–5.2)
ALP SERPL-CCNC: 83 U/L (ref 55–135)
ALT SERPL W/O P-5'-P-CCNC: 22 U/L (ref 10–44)
ANION GAP SERPL CALC-SCNC: 10 MMOL/L (ref 8–16)
AST SERPL-CCNC: 19 U/L (ref 10–40)
BILIRUB SERPL-MCNC: 0.8 MG/DL (ref 0.1–1)
BUN SERPL-MCNC: 20 MG/DL (ref 6–20)
CALCIUM SERPL-MCNC: 9.5 MG/DL (ref 8.7–10.5)
CHLORIDE SERPL-SCNC: 105 MMOL/L (ref 95–110)
CO2 SERPL-SCNC: 26 MMOL/L (ref 23–29)
CREAT SERPL-MCNC: 0.9 MG/DL (ref 0.5–1.4)
EST. GFR  (AFRICAN AMERICAN): >60 ML/MIN/1.73 M^2
EST. GFR  (NON AFRICAN AMERICAN): >60 ML/MIN/1.73 M^2
GLUCOSE SERPL-MCNC: 83 MG/DL (ref 70–110)
POTASSIUM SERPL-SCNC: 4 MMOL/L (ref 3.5–5.1)
PROT SERPL-MCNC: 8 G/DL (ref 6–8.4)
RPR SER QL: NORMAL
SODIUM SERPL-SCNC: 141 MMOL/L (ref 136–145)

## 2019-06-17 PROCEDURE — 87591 N.GONORRHOEAE DNA AMP PROB: CPT

## 2019-06-17 PROCEDURE — 87491 CHLMYD TRACH DNA AMP PROBE: CPT | Mod: 91

## 2019-06-17 PROCEDURE — 36415 COLL VENOUS BLD VENIPUNCTURE: CPT

## 2019-06-17 PROCEDURE — 86592 SYPHILIS TEST NON-TREP QUAL: CPT

## 2019-06-17 PROCEDURE — 87491 CHLMYD TRACH DNA AMP PROBE: CPT

## 2019-06-17 PROCEDURE — 80053 COMPREHEN METABOLIC PANEL: CPT

## 2019-06-17 PROCEDURE — 86703 HIV-1/HIV-2 1 RESULT ANTBDY: CPT

## 2019-06-18 ENCOUNTER — PATIENT MESSAGE (OUTPATIENT)
Dept: INTERNAL MEDICINE | Facility: CLINIC | Age: 24
End: 2019-06-18

## 2019-06-18 LAB
C TRACH DNA SPEC QL NAA+PROBE: NOT DETECTED
HIV 1+2 AB+HIV1 P24 AG SERPL QL IA: NEGATIVE
N GONORRHOEA DNA SPEC QL NAA+PROBE: NOT DETECTED

## 2019-06-19 LAB
C TRACH RRNA SPEC QL NAA+PROBE: NEGATIVE
C.TRACH RNA SOURCE: NORMAL
N GONORRHOEA RRNA SPEC QL NAA+PROBE: NEGATIVE
N GONORRHOEA RRNA SPEC QL NAA+PROBE: NEGATIVE
N.GONORROHEAE, AMP RNA SOURCE: NORMAL
N.GONORROHEAE, AMP RNA SOURCE: NORMAL

## 2019-06-20 ENCOUNTER — TELEPHONE (OUTPATIENT)
Dept: INTERNAL MEDICINE | Facility: CLINIC | Age: 24
End: 2019-06-20

## 2019-06-20 DIAGNOSIS — Z91.89 AT RISK FOR HIV DUE TO HOMOSEXUAL CONTACT: ICD-10-CM

## 2019-06-20 DIAGNOSIS — Z72.51 HIGH RISK SEXUAL BEHAVIOR: ICD-10-CM

## 2019-06-20 RX ORDER — EMTRICITABINE AND TENOFOVIR DISOPROXIL FUMARATE 200; 300 MG/1; MG/1
1 TABLET, FILM COATED ORAL DAILY
Qty: 90 TABLET | Refills: 0 | Status: SHIPPED | OUTPATIENT
Start: 2019-06-20 | End: 2020-11-03

## 2019-06-21 ENCOUNTER — PATIENT MESSAGE (OUTPATIENT)
Dept: INTERNAL MEDICINE | Facility: CLINIC | Age: 24
End: 2019-06-21

## 2019-06-21 DIAGNOSIS — Z72.51 HIGH RISK SEXUAL BEHAVIOR: ICD-10-CM

## 2019-06-21 DIAGNOSIS — Z91.89 AT RISK FOR HIV DUE TO HOMOSEXUAL CONTACT: ICD-10-CM

## 2019-06-21 RX ORDER — EMTRICITABINE AND TENOFOVIR DISOPROXIL FUMARATE 200; 300 MG/1; MG/1
1 TABLET, FILM COATED ORAL DAILY
Qty: 90 TABLET | Refills: 0 | Status: CANCELLED | OUTPATIENT
Start: 2019-06-21 | End: 2020-06-20

## 2019-06-25 ENCOUNTER — TELEPHONE (OUTPATIENT)
Dept: PHARMACY | Facility: CLINIC | Age: 24
End: 2019-06-25

## 2019-07-18 ENCOUNTER — PATIENT MESSAGE (OUTPATIENT)
Dept: ADMINISTRATIVE | Facility: OTHER | Age: 24
End: 2019-07-18

## 2019-07-23 NOTE — TELEPHONE ENCOUNTER
Refill readiness for Truvada as PrEP confirmed with patient; name/ confirmed; no missed doses; no new medications; no side effects noted; address confirmed for  shipment and  delivery.  Patient states they have 7 doses remaining.     Clinical follow-up conducted for Truavda. Name/ confirmed. no missed doses; no new medications; no side effects noted. Patient understands to report any medication changes to [facility] and provider. All questions answered and addressed to patients satisfaction.      Lanre Hamilton, R.Ph., AAHIVP  Clinical Pharmacist, HIV/HCV  [facility]  Phone: [phone]

## 2019-08-15 ENCOUNTER — TELEPHONE (OUTPATIENT)
Dept: PHARMACY | Facility: CLINIC | Age: 24
End: 2019-08-15

## 2019-08-15 NOTE — TELEPHONE ENCOUNTER
FOR DOCUMENTATION ONLY:    Financial Assistance for Truvada approved from 8/1/2019 to 12/31/2019    Source: [employer] Card    ID: [id number]  BIN: [id number]  PCN: [id number]  GRP: [id number]

## 2019-09-13 ENCOUNTER — TELEPHONE (OUTPATIENT)
Dept: PHARMACY | Facility: CLINIC | Age: 24
End: 2019-09-13

## 2019-09-13 DIAGNOSIS — Z11.3 SCREEN FOR STD (SEXUALLY TRANSMITTED DISEASE): Primary | ICD-10-CM

## 2019-09-27 NOTE — TELEPHONE ENCOUNTER
Attempted to reach patient to follow-up on lab status. No answer. Left voicemail for call back. Will continue to follow-up.

## 2019-10-23 ENCOUNTER — PATIENT MESSAGE (OUTPATIENT)
Dept: PHARMACY | Facility: CLINIC | Age: 24
End: 2019-10-23

## 2020-02-25 ENCOUNTER — OFFICE VISIT (OUTPATIENT)
Dept: URGENT CARE | Facility: CLINIC | Age: 25
End: 2020-02-25
Payer: COMMERCIAL

## 2020-02-25 VITALS
DIASTOLIC BLOOD PRESSURE: 82 MMHG | SYSTOLIC BLOOD PRESSURE: 133 MMHG | HEART RATE: 104 BPM | RESPIRATION RATE: 20 BRPM | TEMPERATURE: 101 F | WEIGHT: 185.19 LBS | BODY MASS INDEX: 23.77 KG/M2 | OXYGEN SATURATION: 98 % | HEIGHT: 74 IN

## 2020-02-25 DIAGNOSIS — J10.1 INFLUENZA A: Primary | ICD-10-CM

## 2020-02-25 LAB
CTP QC/QA: YES
FLUAV AG NPH QL: POSITIVE
FLUBV AG NPH QL: NEGATIVE

## 2020-02-25 PROCEDURE — 99214 OFFICE O/P EST MOD 30 MIN: CPT | Mod: 25,S$GLB,, | Performed by: NURSE PRACTITIONER

## 2020-02-25 PROCEDURE — 87804 POCT INFLUENZA A/B: ICD-10-PCS | Mod: 59,QW,S$GLB, | Performed by: NURSE PRACTITIONER

## 2020-02-25 PROCEDURE — 87804 INFLUENZA ASSAY W/OPTIC: CPT | Mod: QW,S$GLB,, | Performed by: NURSE PRACTITIONER

## 2020-02-25 PROCEDURE — 99214 PR OFFICE/OUTPT VISIT, EST, LEVL IV, 30-39 MIN: ICD-10-PCS | Mod: 25,S$GLB,, | Performed by: NURSE PRACTITIONER

## 2020-02-25 RX ORDER — OSELTAMIVIR PHOSPHATE 75 MG/1
75 CAPSULE ORAL 2 TIMES DAILY
Qty: 10 CAPSULE | Refills: 0 | Status: SHIPPED | OUTPATIENT
Start: 2020-02-25 | End: 2020-03-01

## 2020-02-25 NOTE — LETTER
February 25, 2020      [facility] [address] [address]  Phone: [phone]  Fax: [phone]       Patient: Marycarmen Jj   YOB: 1995  Date of Visit: 02/25/2020    To Whom It May Concern:    Nessa Jj  was at [facility] on 02/25/2020. He may return to work/school on 02/29/2020 with no restrictions. If you have any questions or concerns, or if I can be of further assistance, please do not hesitate to contact me.    Sincerely,    Odalis Whatley NP

## 2020-02-25 NOTE — PROGRESS NOTES
"Subjective:       Patient ID: Marycarmen Jj is a 24 y.o. male.    Vitals:  height is 6' 2" (1.88 m) and weight is 84 kg (185 lb 3 oz). His temperature is 100.5 °F (38.1 °C) (abnormal). His blood pressure is 133/82 and his pulse is 104. His respiration is 20 and oxygen saturation is 98%.     Chief Complaint: URI    24 y.o. Male presented to the Urgent Care with complaint of congestion, cough and headaches x 2 days. Fever stated last night.      URI    This is a new problem. The current episode started in the past 7 days. The problem has been gradually worsening. Associated symptoms include congestion, coughing and headaches. Pertinent negatives include no chest pain, diarrhea, dysuria, nausea, rash, sore throat or vomiting. He has tried nothing for the symptoms. The treatment provided no relief.       Constitution: Positive for sweating. Negative for chills, fatigue and fever.   HENT: Positive for congestion. Negative for sore throat.    Neck: Negative for painful lymph nodes.   Cardiovascular: Negative for chest pain and leg swelling.   Eyes: Negative for double vision and blurred vision.   Respiratory: Positive for cough. Negative for shortness of breath.    Gastrointestinal: Negative for nausea, vomiting and diarrhea.   Genitourinary: Negative for dysuria, frequency and urgency.   Musculoskeletal: Positive for muscle ache. Negative for joint pain, joint swelling and muscle cramps.   Skin: Negative for color change, pale and rash.   Allergic/Immunologic: Negative for seasonal allergies.   Neurological: Positive for headaches. Negative for dizziness, history of vertigo, light-headedness and passing out.   Hematologic/Lymphatic: Negative for swollen lymph nodes, easy bruising/bleeding and history of blood clots. Does not bruise/bleed easily.   Psychiatric/Behavioral: Negative for nervous/anxious, sleep disturbance and depression. The patient is not nervous/anxious.        Objective:      Physical Exam "   Constitutional: He is oriented to person, place, and time. Vital signs are normal. He appears well-developed and well-nourished. He is active and cooperative.  Non-toxic appearance. He does not appear ill. No distress.   HENT:   Head: Normocephalic and atraumatic.   Right Ear: Hearing, tympanic membrane, abnromal external ear and ear canal normal.   Left Ear: Hearing, tympanic membrane, abnormal external ear and ear canal normal.   Nose: Mucosal edema and rhinorrhea present. No nasal deformity. No epistaxis. Right sinus exhibits no maxillary sinus tenderness and no frontal sinus tenderness. Left sinus exhibits no maxillary sinus tenderness and no frontal sinus tenderness.   Mouth/Throat: Uvula is midline, oropharynx is clear and moist and mucous membranes are normal. No trismus in the jaw. Normal dentition. No uvula swelling. No oropharyngeal exudate, posterior oropharyngeal edema or posterior oropharyngeal erythema. No tonsillar exudate.   Eyes: Pupils are equal, round, and reactive to light. Conjunctivae, EOM and lids are normal. No scleral icterus.   Neck: Trachea normal, full passive range of motion without pain and phonation normal. Neck supple. No neck rigidity. No edema and no erythema present.   Cardiovascular: Normal rate, regular rhythm, normal heart sounds, intact distal pulses and normal pulses.   Pulses:       Radial pulses are 2+ on the right side, and 2+ on the left side.   Pulmonary/Chest: Effort normal and breath sounds normal. No respiratory distress. He has no decreased breath sounds. He has no rhonchi.   Musculoskeletal: Normal range of motion. He exhibits no edema or deformity.   Neurological: He is alert and oriented to person, place, and time. He exhibits normal muscle tone. Coordination and gait normal.   Skin: Skin is warm, dry, intact, not diaphoretic and not pale. Capillary refill takes less than 2 seconds.   Psychiatric: He has a normal mood and affect. His speech is normal and behavior  is normal. Judgment and thought content normal. Cognition and memory are normal.   Nursing note and vitals reviewed.        Assessment:       1. Influenza A        Results for orders placed or performed in visit on 02/25/20   POCT Influenza A/B   Result Value Ref Range    Rapid Influenza A Ag Positive (A) Negative    Rapid Influenza B Ag Negative Negative     Acceptable Yes        Plan:         Influenza A  -     POCT Influenza A/B  -     oseltamivir (TAMIFLU) 75 MG capsule; Take 1 capsule (75 mg total) by mouth 2 (two) times daily. for 5 days  Dispense: 10 capsule; Refill: 0      Patient Instructions     You have been diagnosed with Influenza.     You are contagious for 24 hours after you start the Tamilfu or 24 hours after your last fever, whichever happens last.  Tamiflu prescription has been discussed and if prescribed, please take to completion unless you cannot tolerate the side effects.   Please drink plenty of fluids to stay hydrated.  Please get plenty of rest.  Take tylenol (acetominophen) for fever, chills or body aches every 4 hours. do not exceed 4000 mg/ day.  Take Motrin (Ibuprofen) every 4 hours for fever, chills, pain or inflammation.  Use an antihistmine such as Claritin or Zyrtec (over the counter) to dry you out.   Use mucinex (guaifenisin) to break up mucous or Delsym to help with coughing at night  Flonase OTC or Nasacort OTC for nasal congestion.  Warm face compresses to help with facial sinus pain/pressure.  Simple foods like chicken noodle soup.    If you were prescribed a narcotic or controlled medication, do not drive or operate heavy equipment or machinery while taking these medications.    You must understand that you've received an Urgent Care treatment only and that you may be released before all your medical problems are known or treated. You, the patient, will arrange for follow up care as instructed.    Follow up with your PCP within the week if symptoms have not  improved or as needed.  You can call [phone] to schedule an appointment with the appropriate provider.  If your condition worsens we recommend that you receive another evaluation at the emergency room immediately or contact your primary medical clinics after hours call service to discuss your concerns.  Please return here or go to the Emergency Department for any concerns or worsening of condition.      The Flu (Influenza)     The virus that causes the flu spreads through the air in droplets when someone who has the flu coughs, sneezes, laughs, or talks.   The flu (influenza) is an infection that affects your respiratory tract. This tract is made up of your mouth, nose, and lungs, and the passages between them. Unlike a cold, the flu can make you very ill. And it can lead to pneumonia, a serious lung infection. The flu can have serious complications and even cause death.  Who is at risk for the flu?  Anyone can get the flu. But you are more likely to become infected if you:  · Have a weakened immune system  · Work in a healthcare setting where you may be exposed to flu germs  · Live or work with someone who has the flu  · Havent had an annual flu shot  How does the flu spread?  The flu is caused by a virus. The virus spreads through the air in droplets when someone who has the flu coughs, sneezes, laughs, or talks. You can become infected when you inhale these viruses directly. You can also become infected when you touch a surface on which the droplets have landed and then transfer the germs to your eyes, nose, or mouth. Touching used tissues, or sharing utensils, drinking glasses, or a toothbrush from an infected person can expose you to flu viruses, too.  What are the symptoms of the flu?  Flu symptoms tend to come on quickly and may last a few days to a few weeks. They include:  · Fever usually higher than 100.4°F  (38°C) and chills  · Sore throat and headache  · Dry cough  · Runny nose  · Tiredness and  weakness  · Muscle aches  Who is at risk for flu complications?  For some people, the flu can be very serious. The risk for complications is greater for:  · Children younger than age 5  · Adults ages 65 and older  · People with a chronic illness such as diabetes or heart, kidney, or lung disease  · People who live in a nursing home or long-term care facility   How is the flu treated?  The flu usually gets better after 7 days or so. In some cases, your healthcare provider may prescribe an antiviral medicine. This may help you get well a little sooner. For the medicine to help, you need to take it as soon as possible (ideally within 48 hours) after your symptoms start. If you develop pneumonia or other serious illness, you may need to stay in the hospital.  Easing flu symptoms  · Drink lots of fluids such as water, juice, and warm soup. A good rule is to drink enough so that you urinate your normal amount.  · Get plenty of rest.  · Ask your healthcare provider what to take for fever and pain.  · Call your provider if your fever is 100.4°F (38°C) or higher, or you become dizzy, lightheaded, or short of breath.  Taking steps to protect others  · Wash your hands often, especially after coughing or sneezing. Or clean your hands with an alcohol-based hand  containing at least 60% alcohol.  · Cough or sneeze into a tissue. Then throw the tissue away and wash your hands. If you dont have a tissue, cough and sneeze into your elbow.  · Stay home until at least 24 hours after you no longer have a fever or chills. Be sure the fever isnt being hidden by fever-reducing medicine.  · Dont share food, utensils, drinking glasses, or a toothbrush with others.  · Ask your healthcare provider if others in your household should get antiviral medicine to help them avoid infection.  How can the flu be prevented?  · One of the best ways to avoid the flu is to get a flu vaccine each year. The virus that causes the flu changes from  year to year. For that reason, healthcare providers recommend getting the flu vaccine each year, as soon as it's available in your area. The vaccine is given as a shot. Your healthcare provider can tell you which vaccine is right for you. A nasal spray is also available but is not recommended for the 5991-0242 flu season. The CDC says this is because the nasal spray did not seem to protect against the flu over the last several flu seasons. In the past, it was meant for people ages 2 to 49.  · Wash your hands often. Frequent handwashing is a proven way to help prevent infection.  · Carry an alcohol-based hand gel containing at least 60% alcohol. Use it when you can't use soap and water. Then wash your hands as soon as you can.  · Avoid touching your eyes, nose, and mouth.  · At home and work, clean phones, computer keyboards, and toys often with disinfectant wipes.  · If possible, avoid close contact with others who have the flu or symptoms of the flu.  Handwashing tips  Handwashing is one of the best ways to prevent many common infections. If you are caring for or visiting someone with the flu, wash your hands each time you enter and leave the room. Follow these steps:  · Use warm water and plenty of soap. Rub your hands together well.  · Clean the whole hand, including under your nails, between your fingers, and up the wrists.  · Wash for at least 15 seconds.  · Rinse, letting the water run down your fingers, not up your wrists.  · Dry your hands well. Use a paper towel to turn off the faucet and open the door.  Using alcohol-based hand   Alcohol-based hand  are also a good choice. Use them when you can't use soap and water. Follow these steps:  · Squeeze about a tablespoon of gel into the palm of one hand.  · Rub your hands together briskly, cleaning the backs of your hands, the palms, between your fingers, and up the wrists.  · Rub until the gel is gone and your hands are completely  dry.  Preventing the flu in healthcare settings  The flu is a special concern for people in hospitals and long-term care facilities. To help prevent the spread of flu, many hospitals and nursing homes take these steps:  · Healthcare providers wash their hands or use an alcohol-based hand  before and after treating each patient.  · People with the flu have private rooms and bathrooms or share a room with someone with the same infection.  · People who are at high risk for the flu but don't have it are encouraged to get the flu and pneumonia vaccines.  · All healthcare workers are encouraged or required to get flu shots.   Date Last Reviewed: 12/1/2016  © 2685-1011 [employer]. [address] [place] [address], [place] [address]. All rights reserved. This information is not intended as a substitute for professional medical care. Always follow your healthcare professional's instructions.

## 2020-02-25 NOTE — PATIENT INSTRUCTIONS
You have been diagnosed with Influenza.     You are contagious for 24 hours after you start the Tamilfu or 24 hours after your last fever, whichever happens last.  Tamiflu prescription has been discussed and if prescribed, please take to completion unless you cannot tolerate the side effects.   Please drink plenty of fluids to stay hydrated.  Please get plenty of rest.  Take tylenol (acetominophen) for fever, chills or body aches every 4 hours. do not exceed 4000 mg/ day.  Take Motrin (Ibuprofen) every 4 hours for fever, chills, pain or inflammation.  Use an antihistmine such as Claritin or Zyrtec (over the counter) to dry you out.   Use mucinex (guaifenisin) to break up mucous or Delsym to help with coughing at night  Flonase OTC or Nasacort OTC for nasal congestion.  Warm face compresses to help with facial sinus pain/pressure.  Simple foods like chicken noodle soup.    If you were prescribed a narcotic or controlled medication, do not drive or operate heavy equipment or machinery while taking these medications.    You must understand that you've received an Urgent Care treatment only and that you may be released before all your medical problems are known or treated. You, the patient, will arrange for follow up care as instructed.    Follow up with your PCP within the week if symptoms have not improved or as needed.  You can call [phone] to schedule an appointment with the appropriate provider.  If your condition worsens we recommend that you receive another evaluation at the emergency room immediately or contact your primary medical clinics after hours call service to discuss your concerns.  Please return here or go to the Emergency Department for any concerns or worsening of condition.      The Flu (Influenza)     The virus that causes the flu spreads through the air in droplets when someone who has the flu coughs, sneezes, laughs, or talks.   The flu (influenza) is an infection that affects your  respiratory tract. This tract is made up of your mouth, nose, and lungs, and the passages between them. Unlike a cold, the flu can make you very ill. And it can lead to pneumonia, a serious lung infection. The flu can have serious complications and even cause death.  Who is at risk for the flu?  Anyone can get the flu. But you are more likely to become infected if you:  · Have a weakened immune system  · Work in a healthcare setting where you may be exposed to flu germs  · Live or work with someone who has the flu  · Havent had an annual flu shot  How does the flu spread?  The flu is caused by a virus. The virus spreads through the air in droplets when someone who has the flu coughs, sneezes, laughs, or talks. You can become infected when you inhale these viruses directly. You can also become infected when you touch a surface on which the droplets have landed and then transfer the germs to your eyes, nose, or mouth. Touching used tissues, or sharing utensils, drinking glasses, or a toothbrush from an infected person can expose you to flu viruses, too.  What are the symptoms of the flu?  Flu symptoms tend to come on quickly and may last a few days to a few weeks. They include:  · Fever usually higher than 100.4°F  (38°C) and chills  · Sore throat and headache  · Dry cough  · Runny nose  · Tiredness and weakness  · Muscle aches  Who is at risk for flu complications?  For some people, the flu can be very serious. The risk for complications is greater for:  · Children younger than age 5  · Adults ages 65 and older  · People with a chronic illness such as diabetes or heart, kidney, or lung disease  · People who live in a nursing home or long-term care facility   How is the flu treated?  The flu usually gets better after 7 days or so. In some cases, your healthcare provider may prescribe an antiviral medicine. This may help you get well a little sooner. For the medicine to help, you need to take it as soon as possible  (ideally within 48 hours) after your symptoms start. If you develop pneumonia or other serious illness, you may need to stay in the hospital.  Easing flu symptoms  · Drink lots of fluids such as water, juice, and warm soup. A good rule is to drink enough so that you urinate your normal amount.  · Get plenty of rest.  · Ask your healthcare provider what to take for fever and pain.  · Call your provider if your fever is 100.4°F (38°C) or higher, or you become dizzy, lightheaded, or short of breath.  Taking steps to protect others  · Wash your hands often, especially after coughing or sneezing. Or clean your hands with an alcohol-based hand  containing at least 60% alcohol.  · Cough or sneeze into a tissue. Then throw the tissue away and wash your hands. If you dont have a tissue, cough and sneeze into your elbow.  · Stay home until at least 24 hours after you no longer have a fever or chills. Be sure the fever isnt being hidden by fever-reducing medicine.  · Dont share food, utensils, drinking glasses, or a toothbrush with others.  · Ask your healthcare provider if others in your household should get antiviral medicine to help them avoid infection.  How can the flu be prevented?  · One of the best ways to avoid the flu is to get a flu vaccine each year. The virus that causes the flu changes from year to year. For that reason, healthcare providers recommend getting the flu vaccine each year, as soon as it's available in your area. The vaccine is given as a shot. Your healthcare provider can tell you which vaccine is right for you. A nasal spray is also available but is not recommended for the 7193-7636 flu season. The CDC says this is because the nasal spray did not seem to protect against the flu over the last several flu seasons. In the past, it was meant for people ages 2 to 49.  · Wash your hands often. Frequent handwashing is a proven way to help prevent infection.  · Carry an alcohol-based hand gel  containing at least 60% alcohol. Use it when you can't use soap and water. Then wash your hands as soon as you can.  · Avoid touching your eyes, nose, and mouth.  · At home and work, clean phones, computer keyboards, and toys often with disinfectant wipes.  · If possible, avoid close contact with others who have the flu or symptoms of the flu.  Handwashing tips  Handwashing is one of the best ways to prevent many common infections. If you are caring for or visiting someone with the flu, wash your hands each time you enter and leave the room. Follow these steps:  · Use warm water and plenty of soap. Rub your hands together well.  · Clean the whole hand, including under your nails, between your fingers, and up the wrists.  · Wash for at least 15 seconds.  · Rinse, letting the water run down your fingers, not up your wrists.  · Dry your hands well. Use a paper towel to turn off the faucet and open the door.  Using alcohol-based hand   Alcohol-based hand  are also a good choice. Use them when you can't use soap and water. Follow these steps:  · Squeeze about a tablespoon of gel into the palm of one hand.  · Rub your hands together briskly, cleaning the backs of your hands, the palms, between your fingers, and up the wrists.  · Rub until the gel is gone and your hands are completely dry.  Preventing the flu in healthcare settings  The flu is a special concern for people in hospitals and long-term care facilities. To help prevent the spread of flu, many hospitals and nursing homes take these steps:  · Healthcare providers wash their hands or use an alcohol-based hand  before and after treating each patient.  · People with the flu have private rooms and bathrooms or share a room with someone with the same infection.  · People who are at high risk for the flu but don't have it are encouraged to get the flu and pneumonia vaccines.  · All healthcare workers are encouraged or required to get flu shots.    Date Last Reviewed: 12/1/2016  © 2947-6866 [employer]. [address] [place] [address], [place] [address]. All rights reserved. This information is not intended as a substitute for professional medical care. Always follow your healthcare professional's instructions.

## 2020-11-03 ENCOUNTER — CLINICAL SUPPORT (OUTPATIENT)
Dept: INTERNAL MEDICINE | Facility: CLINIC | Age: 25
End: 2020-11-03
Payer: COMMERCIAL

## 2020-11-03 ENCOUNTER — LAB VISIT (OUTPATIENT)
Dept: LAB | Facility: OTHER | Age: 25
End: 2020-11-03
Attending: INTERNAL MEDICINE
Payer: COMMERCIAL

## 2020-11-03 ENCOUNTER — OFFICE VISIT (OUTPATIENT)
Dept: INTERNAL MEDICINE | Facility: CLINIC | Age: 25
End: 2020-11-03
Attending: INTERNAL MEDICINE
Payer: COMMERCIAL

## 2020-11-03 VITALS
WEIGHT: 218.06 LBS | DIASTOLIC BLOOD PRESSURE: 86 MMHG | SYSTOLIC BLOOD PRESSURE: 132 MMHG | BODY MASS INDEX: 27.99 KG/M2 | OXYGEN SATURATION: 98 % | HEART RATE: 82 BPM

## 2020-11-03 DIAGNOSIS — F32.A ANXIETY AND DEPRESSION: ICD-10-CM

## 2020-11-03 DIAGNOSIS — A63.0 ANAL CONDYLOMA: ICD-10-CM

## 2020-11-03 DIAGNOSIS — Z00.00 ANNUAL PHYSICAL EXAM: Primary | ICD-10-CM

## 2020-11-03 DIAGNOSIS — F41.9 ANXIETY AND DEPRESSION: ICD-10-CM

## 2020-11-03 DIAGNOSIS — Z23 IMMUNIZATION DUE: Primary | ICD-10-CM

## 2020-11-03 DIAGNOSIS — Z00.00 ANNUAL PHYSICAL EXAM: ICD-10-CM

## 2020-11-03 LAB
ALBUMIN SERPL BCP-MCNC: 4.8 G/DL (ref 3.5–5.2)
ALP SERPL-CCNC: 82 U/L (ref 55–135)
ALT SERPL W/O P-5'-P-CCNC: 24 U/L (ref 10–44)
ANION GAP SERPL CALC-SCNC: 13 MMOL/L (ref 8–16)
AST SERPL-CCNC: 17 U/L (ref 10–40)
BASOPHILS # BLD AUTO: 0.04 K/UL (ref 0–0.2)
BASOPHILS NFR BLD: 0.6 % (ref 0–1.9)
BILIRUB SERPL-MCNC: 1 MG/DL (ref 0.1–1)
BUN SERPL-MCNC: 10 MG/DL (ref 6–20)
CALCIUM SERPL-MCNC: 9.2 MG/DL (ref 8.7–10.5)
CHLORIDE SERPL-SCNC: 104 MMOL/L (ref 95–110)
CO2 SERPL-SCNC: 24 MMOL/L (ref 23–29)
CREAT SERPL-MCNC: 1 MG/DL (ref 0.5–1.4)
DIFFERENTIAL METHOD: NORMAL
EOSINOPHIL # BLD AUTO: 0.2 K/UL (ref 0–0.5)
EOSINOPHIL NFR BLD: 2.4 % (ref 0–8)
ERYTHROCYTE [DISTWIDTH] IN BLOOD BY AUTOMATED COUNT: 12.6 % (ref 11.5–14.5)
EST. GFR  (AFRICAN AMERICAN): >60 ML/MIN/1.73 M^2
EST. GFR  (NON AFRICAN AMERICAN): >60 ML/MIN/1.73 M^2
ESTIMATED AVG GLUCOSE: 85 MG/DL (ref 68–131)
GLUCOSE SERPL-MCNC: 82 MG/DL (ref 70–110)
HBA1C MFR BLD HPLC: 4.6 % (ref 4–5.6)
HCT VFR BLD AUTO: 47.8 % (ref 40–54)
HGB BLD-MCNC: 16.1 G/DL (ref 14–18)
IMM GRANULOCYTES # BLD AUTO: 0.02 K/UL (ref 0–0.04)
IMM GRANULOCYTES NFR BLD AUTO: 0.3 % (ref 0–0.5)
LYMPHOCYTES # BLD AUTO: 2.4 K/UL (ref 1–4.8)
LYMPHOCYTES NFR BLD: 38.1 % (ref 18–48)
MCH RBC QN AUTO: 29.7 PG (ref 27–31)
MCHC RBC AUTO-ENTMCNC: 33.7 G/DL (ref 32–36)
MCV RBC AUTO: 88 FL (ref 82–98)
MONOCYTES # BLD AUTO: 0.5 K/UL (ref 0.3–1)
MONOCYTES NFR BLD: 7.6 % (ref 4–15)
NEUTROPHILS # BLD AUTO: 3.2 K/UL (ref 1.8–7.7)
NEUTROPHILS NFR BLD: 51 % (ref 38–73)
NRBC BLD-RTO: 0 /100 WBC
PLATELET # BLD AUTO: 339 K/UL (ref 150–350)
PMV BLD AUTO: 10.6 FL (ref 9.2–12.9)
POTASSIUM SERPL-SCNC: 3.8 MMOL/L (ref 3.5–5.1)
PROT SERPL-MCNC: 7.7 G/DL (ref 6–8.4)
RBC # BLD AUTO: 5.42 M/UL (ref 4.6–6.2)
SODIUM SERPL-SCNC: 141 MMOL/L (ref 136–145)
TSH SERPL DL<=0.005 MIU/L-ACNC: 1.75 UIU/ML (ref 0.4–4)
WBC # BLD AUTO: 6.35 K/UL (ref 3.9–12.7)

## 2020-11-03 PROCEDURE — 1126F PR PAIN SEVERITY QUANTIFIED, NO PAIN PRESENT: ICD-10-PCS | Mod: S$GLB,,, | Performed by: INTERNAL MEDICINE

## 2020-11-03 PROCEDURE — 90472 IMMUNIZATION ADMIN EACH ADD: CPT | Mod: S$GLB,,, | Performed by: INTERNAL MEDICINE

## 2020-11-03 PROCEDURE — 83036 HEMOGLOBIN GLYCOSYLATED A1C: CPT

## 2020-11-03 PROCEDURE — 90715 TDAP VACCINE 7 YRS/> IM: CPT | Mod: S$GLB,,, | Performed by: INTERNAL MEDICINE

## 2020-11-03 PROCEDURE — 85025 COMPLETE CBC W/AUTO DIFF WBC: CPT

## 2020-11-03 PROCEDURE — 3008F BODY MASS INDEX DOCD: CPT | Mod: CPTII,S$GLB,, | Performed by: INTERNAL MEDICINE

## 2020-11-03 PROCEDURE — 90651 9VHPV VACCINE 2/3 DOSE IM: CPT | Mod: S$GLB,,, | Performed by: INTERNAL MEDICINE

## 2020-11-03 PROCEDURE — 1126F AMNT PAIN NOTED NONE PRSNT: CPT | Mod: S$GLB,,, | Performed by: INTERNAL MEDICINE

## 2020-11-03 PROCEDURE — 3008F PR BODY MASS INDEX (BMI) DOCUMENTED: ICD-10-PCS | Mod: CPTII,S$GLB,, | Performed by: INTERNAL MEDICINE

## 2020-11-03 PROCEDURE — 80061 LIPID PANEL: CPT

## 2020-11-03 PROCEDURE — 90471 IMMUNIZATION ADMIN: CPT | Mod: S$GLB,,, | Performed by: INTERNAL MEDICINE

## 2020-11-03 PROCEDURE — 90471 HPV VACCINE 9-VALENT 3 DOSE IM: ICD-10-PCS | Mod: S$GLB,,, | Performed by: INTERNAL MEDICINE

## 2020-11-03 PROCEDURE — 90651 HPV VACCINE 9-VALENT 3 DOSE IM: ICD-10-PCS | Mod: S$GLB,,, | Performed by: INTERNAL MEDICINE

## 2020-11-03 PROCEDURE — 90472 TDAP VACCINE GREATER THAN OR EQUAL TO 7YO IM: ICD-10-PCS | Mod: S$GLB,,, | Performed by: INTERNAL MEDICINE

## 2020-11-03 PROCEDURE — 99999 PR PBB SHADOW E&M-EST. PATIENT-LVL III: ICD-10-PCS | Mod: PBBFAC,,, | Performed by: INTERNAL MEDICINE

## 2020-11-03 PROCEDURE — 84443 ASSAY THYROID STIM HORMONE: CPT

## 2020-11-03 PROCEDURE — 80053 COMPREHEN METABOLIC PANEL: CPT

## 2020-11-03 PROCEDURE — 99395 PREV VISIT EST AGE 18-39: CPT | Mod: 25,S$GLB,, | Performed by: INTERNAL MEDICINE

## 2020-11-03 PROCEDURE — 99999 PR PBB SHADOW E&M-EST. PATIENT-LVL III: CPT | Mod: PBBFAC,,, | Performed by: INTERNAL MEDICINE

## 2020-11-03 PROCEDURE — 99395 PR PREVENTIVE VISIT,EST,18-39: ICD-10-PCS | Mod: 25,S$GLB,, | Performed by: INTERNAL MEDICINE

## 2020-11-03 PROCEDURE — 36415 COLL VENOUS BLD VENIPUNCTURE: CPT

## 2020-11-03 PROCEDURE — 90715 TDAP VACCINE GREATER THAN OR EQUAL TO 7YO IM: ICD-10-PCS | Mod: S$GLB,,, | Performed by: INTERNAL MEDICINE

## 2020-11-03 NOTE — PROGRESS NOTES
"Subjective:       Patient ID: Marycarmen Jj is a 25 y.o. male.    Chief Complaint: Annual Exam, Sinus Problem, Nasal Congestion, and Depression    Here for annual exam    Lump on left testicle      noticed 2 months prior. Saw Internet video prompting him to perform self exam. Non painful. No change in size. No TTP. He denies urinary frequency, urinary urgency, decreased force of stream, incomplete emptying of bladder, post void dribble, nocturia, or gross hematuria.     Sinus congestion     Chronic low level of difficulty with nasal breathing. Alternating nasal congestion. Long Hx of high pitched intermittent tinnitus, several times a week for "entire life".     Depressive symptoms- bouts of apathy, hard to get out of bed, anhedonia, lack of motivation. No SI/HI      Review of Systems   Constitutional: Negative for appetite change, chills, fever and unexpected weight change.   HENT: Negative for hearing loss, sore throat and trouble swallowing.    Eyes: Negative for visual disturbance.   Respiratory: Negative for cough, chest tightness and shortness of breath.    Cardiovascular: Negative for chest pain and leg swelling.   Gastrointestinal: Negative for abdominal pain, blood in stool, constipation, diarrhea, nausea and vomiting.   Endocrine: Negative for polydipsia and polyuria.   Genitourinary: Negative for decreased urine volume, difficulty urinating, dysuria, frequency and urgency.   Musculoskeletal: Negative for gait problem.   Skin: Negative for rash.   Neurological: Negative for dizziness and numbness.   Psychiatric/Behavioral: The patient is not nervous/anxious.        Objective:      Vitals:    11/03/20 1235   BP: 132/86   Pulse: 82   SpO2: 98%   Weight: 98.9 kg (218 lb 0.6 oz)      Physical Exam  Constitutional:       General: He is not in acute distress.     Appearance: He is well-developed.   HENT:      Head: Normocephalic and atraumatic.      Mouth/Throat:      Pharynx: No oropharyngeal exudate. "   Eyes:      General: No scleral icterus.     Conjunctiva/sclera: Conjunctivae normal.      Pupils: Pupils are equal, round, and reactive to light.   Neck:      Thyroid: No thyromegaly.   Cardiovascular:      Rate and Rhythm: Normal rate and regular rhythm.      Heart sounds: Normal heart sounds. No murmur.   Pulmonary:      Effort: Pulmonary effort is normal.      Breath sounds: Normal breath sounds. No wheezing or rales.   Abdominal:      General: There is no distension.      Palpations: Abdomen is soft.      Tenderness: There is no abdominal tenderness.   Musculoskeletal:         General: No tenderness.   Lymphadenopathy:      Cervical: No cervical adenopathy.   Skin:     General: Skin is warm and dry.   Neurological:      Mental Status: He is alert and oriented to person, place, and time.   Psychiatric:         Behavior: Behavior normal.         Assessment:       1. Annual physical exam    2. Anxiety and depression    3. Anal condyloma        Plan:       Marycarmen was seen today for annual exam, sinus problem, nasal congestion and depression.    Diagnoses and all orders for this visit:    Annual physical exam  -     Comprehensive Metabolic Panel; Future  -     Lipid Panel; Future  -     TSH; Future  -     CBC Auto Differential; Future  -     Hemoglobin A1C; Future    Anxiety and depression  -     Ambulatory referral/consult to Psychology; Future  -     Ambulatory referral/consult to Psychology; Future  -     Ambulatory referral/consult to Psychology; Future  -     Ambulatory referral/consult to Psychology; Future  -     Ambulatory referral/consult to Psychology; Future  -     Ambulatory referral/consult to Psychology; Future    Anal condyloma  Pt will self schedule f/u with colorectal for surveillance. He is aware of potential malignant potential. UTD on HPV vaccinations.           Hitesh Crenshaw MD  Internal Medicine-[facility]        Side effects of medication(s) were discussed in detail and patient voiced  understanding.  Patient will call back for any issues or complications.

## 2020-11-03 NOTE — PROGRESS NOTES
"  Patient was given vaccine information sheet for the Tdap immunization. The area of injection was palpated using the acromion process as a landmark. This area was cleaned with alcohol. Using a 25g 1" safety needle, 0.5mL of the vaccine was placed into the left deltoid muscle. The injection site was dressed with a bandage. Patient experienced no complications and was discharged in stable condition. Tdap Lot: [id number] Exp: 02/25/2022.    Patient was given vaccine information sheet for the HPV-9 vaccine. The area of injection was palpated using the acromion process as a landmark. 0.5mL of the vaccine was placed into the left deltoid muscle. Patient experienced no complications and was discharged in stable condition. HPV Lot: [id number] Exp: 12/09/2021.    "

## 2020-11-04 ENCOUNTER — PATIENT MESSAGE (OUTPATIENT)
Dept: INTERNAL MEDICINE | Facility: CLINIC | Age: 25
End: 2020-11-04

## 2020-11-04 ENCOUNTER — IMMUNIZATION (OUTPATIENT)
Dept: PHARMACY | Facility: CLINIC | Age: 25
End: 2020-11-04
Payer: COMMERCIAL

## 2020-11-04 LAB
CHOLEST SERPL-MCNC: 176 MG/DL (ref 120–199)
CHOLEST/HDLC SERPL: 3.7 {RATIO} (ref 2–5)
HDLC SERPL-MCNC: 48 MG/DL (ref 40–75)
HDLC SERPL: 27.3 % (ref 20–50)
LDLC SERPL CALC-MCNC: 109.6 MG/DL (ref 63–159)
NONHDLC SERPL-MCNC: 128 MG/DL
TRIGL SERPL-MCNC: 92 MG/DL (ref 30–150)

## 2020-11-06 RX ORDER — PAROXETINE 10 MG/1
10 TABLET, FILM COATED ORAL EVERY MORNING
Qty: 90 TABLET | Refills: 1 | Status: SHIPPED | OUTPATIENT
Start: 2020-11-06 | End: 2021-11-06

## 2020-11-13 ENCOUNTER — TELEPHONE (OUTPATIENT)
Dept: SURGERY | Facility: CLINIC | Age: 25
End: 2020-11-13

## 2020-11-16 ENCOUNTER — OFFICE VISIT (OUTPATIENT)
Dept: SURGERY | Facility: CLINIC | Age: 25
End: 2020-11-16
Payer: COMMERCIAL

## 2020-11-16 VITALS
SYSTOLIC BLOOD PRESSURE: 138 MMHG | DIASTOLIC BLOOD PRESSURE: 94 MMHG | WEIGHT: 217.69 LBS | HEIGHT: 73 IN | BODY MASS INDEX: 28.85 KG/M2

## 2020-11-16 DIAGNOSIS — Z87.19 HISTORY OF ANAL DYSPLASIA: Primary | ICD-10-CM

## 2020-11-16 PROCEDURE — 99213 OFFICE O/P EST LOW 20 MIN: CPT | Mod: 25,S$GLB,, | Performed by: COLON & RECTAL SURGERY

## 2020-11-16 PROCEDURE — 3008F PR BODY MASS INDEX (BMI) DOCUMENTED: ICD-10-PCS | Mod: CPTII,S$GLB,, | Performed by: COLON & RECTAL SURGERY

## 2020-11-16 PROCEDURE — 99999 PR PBB SHADOW E&M-EST. PATIENT-LVL III: ICD-10-PCS | Mod: PBBFAC,,, | Performed by: COLON & RECTAL SURGERY

## 2020-11-16 PROCEDURE — 1126F AMNT PAIN NOTED NONE PRSNT: CPT | Mod: S$GLB,,, | Performed by: COLON & RECTAL SURGERY

## 2020-11-16 PROCEDURE — 99213 PR OFFICE/OUTPT VISIT, EST, LEVL III, 20-29 MIN: ICD-10-PCS | Mod: 25,S$GLB,, | Performed by: COLON & RECTAL SURGERY

## 2020-11-16 PROCEDURE — 3008F BODY MASS INDEX DOCD: CPT | Mod: CPTII,S$GLB,, | Performed by: COLON & RECTAL SURGERY

## 2020-11-16 PROCEDURE — 1126F PR PAIN SEVERITY QUANTIFIED, NO PAIN PRESENT: ICD-10-PCS | Mod: S$GLB,,, | Performed by: COLON & RECTAL SURGERY

## 2020-11-16 PROCEDURE — 46600 PR DIAG2STIC A2SCOPY: ICD-10-PCS | Mod: S$GLB,,, | Performed by: COLON & RECTAL SURGERY

## 2020-11-16 PROCEDURE — 99999 PR PBB SHADOW E&M-EST. PATIENT-LVL III: CPT | Mod: PBBFAC,,, | Performed by: COLON & RECTAL SURGERY

## 2020-11-16 PROCEDURE — 46600 DIAGNOSTIC ANOSCOPY SPX: CPT | Mod: S$GLB,,, | Performed by: COLON & RECTAL SURGERY

## 2020-11-16 NOTE — LETTER
November 16, 2020      Hitesh Dumont MD  [address]  [address]  [facility] [address]           [facility]  [address]  [facility] [address]  Phone: [phone]  Fax: [phone]          Patient: Marycarmen Jj   MR Number: [id number]   YOB: 1995   Date of Visit: 11/16/2020       Dear Dr Dumont:    Thank you for referring Marycarmen Jj to me for evaluation. Attached you will find relevant portions of my assessment and plan of care.    If you have questions, please do not hesitate to call me. I look forward to following Marycarmen Jj along with you.    Sincerely,    Mark Bella MD    Enclosure  CC:  No Recipients    If you would like to receive this communication electronically, please contact [email or web address] or [phone] to request more information on [facility] [employer] access.    For providers and/or their staff who would like to refer a patient to [facility], please contact us through our one-stop-shop provider referral line, [facility], at [phone].    If you feel you have received this communication in error or would no longer like to receive these types of communications, please e-mail [email or web address]

## 2020-11-16 NOTE — PROGRESS NOTES
Subjective:       Patient ID: Marycarmen Jj is a 25 y.o. male.    Chief Complaint: Anal Warts    HPI 24 yo M initially seen in 2017 after abnormal anal PAP smear.  Anal PAP done by PCP 5/10/17 - Low-grade squamous intraepithelial lesion (LSIL).  +MSM, +anal receptive intercourse.  Taken to OR 6/16/17 for fulgaration/excision of anal condyloma.  Found to have near-circumferential internal lesions at level of anal transition zone.  Path showed LGD.    He returned 9/2017 for surveillance exam. No new complaints.  No anal pain or bleeding.  Exam normal.  He was to follow up in 3-6 months.    He returned April 2019 for follow-up.  No new complaints.  No anal pain or bleeding.  No recurrent lesions on exam.    He returns today for surveillance.  HIV negative 6/2019.  No new complaints.  No anal pain or bleeding.  He thinks he may feel an external lesion in LPL position.    Last colonoscopy - Never  No family hx of CRC or IBD.    Review of patient's allergies indicates:  No Known Allergies    History reviewed. No pertinent past medical history.    History reviewed. No pertinent surgical history.    Current Outpatient Medications   Medication Sig Dispense Refill    paroxetine (PAXIL) 10 MG tablet Take 1 tablet (10 mg total) by mouth every morning. 90 tablet 1     No current facility-administered medications for this visit.        Family History   Problem Relation Age of Onset    No Known Problems Mother     Arthritis Father     Hypertension Maternal Uncle        Social History     Socioeconomic History    Marital status: Single     Spouse name: Not on file    Number of children: Not on file    Years of education: Not on file    Highest education level: Not on file   Occupational History    Not on file   Social Needs    Financial resource strain: Not on file    Food insecurity     Worry: Not on file     Inability: Not on file    Transportation needs     Medical: Not on file     Non-medical: Not on file    Tobacco Use    Smoking status: Never Smoker    Smokeless tobacco: Never Used   Substance and Sexual Activity    Alcohol use: Yes    Drug use: Never    Sexual activity: Yes   Lifestyle    Physical activity     Days per week: Not on file     Minutes per session: Not on file    Stress: Not on file   Relationships    Social connections     Talks on phone: Not on file     Gets together: Not on file     Attends Jainism service: Not on file     Active member of club or organization: Not on file     Attends meetings of clubs or organizations: Not on file     Relationship status: Not on file   Other Topics Concern    Not on file   Social History Narrative    Not on file       Review of Systems   Constitutional: Negative for chills and fever.   HENT: Negative for congestion and sinus pressure.    Eyes: Negative for visual disturbance.   Respiratory: Negative for cough and shortness of breath.    Cardiovascular: Negative for chest pain and palpitations.   Gastrointestinal: Negative for abdominal distention, abdominal pain, anal bleeding, blood in stool, constipation, diarrhea, nausea, rectal pain and vomiting.   Endocrine: Negative for cold intolerance and heat intolerance.   Genitourinary: Negative for dysuria and frequency.   Musculoskeletal: Negative for arthralgias and back pain.   Skin: Negative for rash.   Allergic/Immunologic: Negative for immunocompromised state.   Neurological: Negative for dizziness, light-headedness and headaches.   Psychiatric/Behavioral: Negative for confusion. The patient is not nervous/anxious.        Objective:      Physical Exam  Constitutional:       Appearance: He is well-developed.   HENT:      Head: Normocephalic and atraumatic.   Eyes:      Conjunctiva/sclera: Conjunctivae normal.   Pulmonary:      Effort: Pulmonary effort is normal. No respiratory distress.   Abdominal:      General: Bowel sounds are normal. There is no distension.      Palpations: Abdomen is soft. There is  no mass.      Tenderness: There is no abdominal tenderness. There is no guarding or rebound.   Genitourinary:     Comments: Perineum - normal perianal skin, no mass, no fissure, no external hemorrhoids, no external condyloma  DEVEN - good tone, no mass  Anoscopy - no internal condyloma, no significant internal hemorrhoids    Skin:     General: Skin is warm and dry.      Findings: No erythema.   Neurological:      Mental Status: He is alert and oriented to person, place, and time.           Lab Results   Component Value Date    WBC 6.35 11/03/2020    HGB 16.1 11/03/2020    HCT 47.8 11/03/2020    MCV 88 11/03/2020     11/03/2020     BMP  Lab Results   Component Value Date     11/03/2020    K 3.8 11/03/2020     11/03/2020    CO2 24 11/03/2020    BUN 10 11/03/2020    CREATININE 1.0 11/03/2020    CALCIUM 9.2 11/03/2020    ANIONGAP 13 11/03/2020    ESTGFRAFRICA >60 11/03/2020    EGFRNONAA >60 11/03/2020     CMP  Sodium   Date Value Ref Range Status   11/03/2020 141 136 - 145 mmol/L Final     Potassium   Date Value Ref Range Status   11/03/2020 3.8 3.5 - 5.1 mmol/L Final     Chloride   Date Value Ref Range Status   11/03/2020 104 95 - 110 mmol/L Final     CO2   Date Value Ref Range Status   11/03/2020 24 23 - 29 mmol/L Final     Glucose   Date Value Ref Range Status   11/03/2020 82 70 - 110 mg/dL Final     BUN   Date Value Ref Range Status   11/03/2020 10 6 - 20 mg/dL Final     Creatinine   Date Value Ref Range Status   11/03/2020 1.0 0.5 - 1.4 mg/dL Final     Calcium   Date Value Ref Range Status   11/03/2020 9.2 8.7 - 10.5 mg/dL Final     Total Protein   Date Value Ref Range Status   11/03/2020 7.7 6.0 - 8.4 g/dL Final     Albumin   Date Value Ref Range Status   11/03/2020 4.8 3.5 - 5.2 g/dL Final     Total Bilirubin   Date Value Ref Range Status   11/03/2020 1.0 0.1 - 1.0 mg/dL Final     Comment:     For infants and newborns, interpretation of results should be based  on gestational age, weight and in  agreement with clinical  observations.  Premature Infant recommended reference ranges:  Up to 24 hours.............<8.0 mg/dL  Up to 48 hours............<12.0 mg/dL  3-5 days..................<15.0 mg/dL  6-29 days.................<15.0 mg/dL       Alkaline Phosphatase   Date Value Ref Range Status   11/03/2020 82 55 - 135 U/L Final     AST   Date Value Ref Range Status   11/03/2020 17 10 - 40 U/L Final     ALT   Date Value Ref Range Status   11/03/2020 24 10 - 44 U/L Final     Anion Gap   Date Value Ref Range Status   11/03/2020 13 8 - 16 mmol/L Final     eGFR if    Date Value Ref Range Status   11/03/2020 >60 >60 mL/min/1.73 m^2 Final     eGFR if non    Date Value Ref Range Status   11/03/2020 >60 >60 mL/min/1.73 m^2 Final     Comment:     Calculation used to obtain the estimated glomerular filtration  rate (eGFR) is the CKD-EPI equation.        No results found for: CEA    Assessment:       1. History of anal dysplasia      Normal surveillance anoscopy today    Plan:   RTO for surveillance anoscopy 12 months    Mark Bella MD, FACS, FASCRS  Staff Surgeon  Department of Colon & Rectal Surgery

## 2020-12-13 ENCOUNTER — PATIENT MESSAGE (OUTPATIENT)
Dept: INTERNAL MEDICINE | Facility: CLINIC | Age: 25
End: 2020-12-13

## 2020-12-13 DIAGNOSIS — R09.81 STUFFY NOSE: Primary | ICD-10-CM

## 2020-12-14 RX ORDER — AZELASTINE 1 MG/ML
1 SPRAY, METERED NASAL 2 TIMES DAILY
Qty: 30 ML | Refills: 1 | Status: SHIPPED | OUTPATIENT
Start: 2020-12-14 | End: 2021-12-14

## 2020-12-14 RX ORDER — FLUTICASONE PROPIONATE 50 MCG
1 SPRAY, SUSPENSION (ML) NASAL DAILY
Qty: 11.1 ML | Refills: 1 | Status: SHIPPED | OUTPATIENT
Start: 2020-12-14

## 2020-12-18 ENCOUNTER — TELEPHONE (OUTPATIENT)
Dept: INTERNAL MEDICINE | Facility: CLINIC | Age: 25
End: 2020-12-18

## 2020-12-29 DIAGNOSIS — Z23 IMMUNIZATION DUE: Primary | ICD-10-CM

## 2021-01-04 ENCOUNTER — CLINICAL SUPPORT (OUTPATIENT)
Dept: INTERNAL MEDICINE | Facility: CLINIC | Age: 26
End: 2021-01-04
Payer: COMMERCIAL

## 2021-01-04 PROCEDURE — 90651 HPV VACCINE 9-VALENT 3 DOSE IM: ICD-10-PCS | Mod: S$GLB,,, | Performed by: INTERNAL MEDICINE

## 2021-01-04 PROCEDURE — 90471 HPV VACCINE 9-VALENT 3 DOSE IM: ICD-10-PCS | Mod: S$GLB,,, | Performed by: INTERNAL MEDICINE

## 2021-01-04 PROCEDURE — 90471 IMMUNIZATION ADMIN: CPT | Mod: S$GLB,,, | Performed by: INTERNAL MEDICINE

## 2021-01-04 PROCEDURE — 90651 9VHPV VACCINE 2/3 DOSE IM: CPT | Mod: S$GLB,,, | Performed by: INTERNAL MEDICINE

## 2021-03-27 ENCOUNTER — IMMUNIZATION (OUTPATIENT)
Dept: PRIMARY CARE CLINIC | Facility: CLINIC | Age: 26
End: 2021-03-27
Payer: COMMERCIAL

## 2021-03-27 DIAGNOSIS — Z23 NEED FOR VACCINATION: Primary | ICD-10-CM

## 2021-03-27 PROCEDURE — 91300 COVID-19, MRNA, LNP-S, PF, 30 MCG/0.3 ML DOSE VACCINE: CPT | Mod: PBBFAC | Performed by: EMERGENCY MEDICINE

## 2021-04-21 ENCOUNTER — IMMUNIZATION (OUTPATIENT)
Dept: PRIMARY CARE CLINIC | Facility: CLINIC | Age: 26
End: 2021-04-21
Payer: COMMERCIAL

## 2021-04-21 DIAGNOSIS — Z23 NEED FOR VACCINATION: Primary | ICD-10-CM

## 2021-04-21 PROCEDURE — 0002A COVID-19, MRNA, LNP-S, PF, 30 MCG/0.3 ML DOSE VACCINE: CPT | Mod: PBBFAC | Performed by: EMERGENCY MEDICINE

## 2021-04-21 PROCEDURE — 91300 COVID-19, MRNA, LNP-S, PF, 30 MCG/0.3 ML DOSE VACCINE: CPT | Mod: PBBFAC | Performed by: EMERGENCY MEDICINE

## 2021-05-04 DIAGNOSIS — Z23 IMMUNIZATION DUE: Primary | ICD-10-CM

## 2021-08-18 ENCOUNTER — PATIENT MESSAGE (OUTPATIENT)
Dept: INTERNAL MEDICINE | Facility: CLINIC | Age: 26
End: 2021-08-18

## 2021-08-23 ENCOUNTER — CLINICAL SUPPORT (OUTPATIENT)
Dept: URGENT CARE | Facility: CLINIC | Age: 26
End: 2021-08-23
Payer: COMMERCIAL

## 2021-08-23 DIAGNOSIS — Z20.822 EXPOSURE TO COVID-19 VIRUS: Primary | ICD-10-CM

## 2021-08-23 LAB
CTP QC/QA: YES
SARS-COV-2 RDRP RESP QL NAA+PROBE: NEGATIVE

## 2021-08-23 PROCEDURE — U0002: ICD-10-PCS | Mod: QW,S$GLB,, | Performed by: EMERGENCY MEDICINE

## 2021-08-23 PROCEDURE — U0002 COVID-19 LAB TEST NON-CDC: HCPCS | Mod: QW,S$GLB,, | Performed by: EMERGENCY MEDICINE

## 2022-01-13 ENCOUNTER — PATIENT OUTREACH (OUTPATIENT)
Dept: ADMINISTRATIVE | Facility: HOSPITAL | Age: 27
End: 2022-01-13
Payer: COMMERCIAL

## 2022-01-13 ENCOUNTER — PATIENT MESSAGE (OUTPATIENT)
Dept: ADMINISTRATIVE | Facility: HOSPITAL | Age: 27
End: 2022-01-13
Payer: COMMERCIAL

## 2022-05-30 ENCOUNTER — PATIENT MESSAGE (OUTPATIENT)
Dept: ADMINISTRATIVE | Facility: HOSPITAL | Age: 27
End: 2022-05-30
Payer: COMMERCIAL

## 2023-05-13 NOTE — TELEPHONE ENCOUNTER
Initial clinical follow-up conducted for Truvada.  Name/ confirmed. No missed doses; no new medications; no side effects noted.  Patient states he is doing well on Truvada with no issues upon initiation.  Informed patient that refill will occur in about 2 weeks and for patient to call with any questions.  Patient verbalized understanding.   jorge luis, pt nonverbal

## (undated) DEVICE — PANTIES FEMININE NAPKIN LG/XLG

## (undated) DEVICE — CANISTER FILTER DISP.

## (undated) DEVICE — TRAY MINOR GEN SURG

## (undated) DEVICE — GAUZE VASELINE PETRO 3X9

## (undated) DEVICE — NDL HYPO A BEVEL 22X1 1/2

## (undated) DEVICE — SEE MEDLINE ITEM 146417

## (undated) DEVICE — SEE MEDLINE ITEM 157148

## (undated) DEVICE — TUBING FILTER

## (undated) DEVICE — GOWN SMART IMP BREATHABLE XXLG

## (undated) DEVICE — GOWN SURGICAL X-LARGE

## (undated) DEVICE — TUBING SUCTION STERILE

## (undated) DEVICE — ELECTRODE REM PLYHSV RETURN 9

## (undated) DEVICE — SYR ONLY LUER LOCK 20CC

## (undated) DEVICE — PAD ABD 8X10 STERILE

## (undated) DEVICE — DRESSING SPONGE 16PLY 4X4 NS